# Patient Record
Sex: FEMALE | Race: WHITE | Employment: FULL TIME | URBAN - METROPOLITAN AREA
[De-identification: names, ages, dates, MRNs, and addresses within clinical notes are randomized per-mention and may not be internally consistent; named-entity substitution may affect disease eponyms.]

---

## 2017-04-24 ENCOUNTER — HOSPITAL ENCOUNTER (OUTPATIENT)
Dept: MAMMOGRAPHY | Age: 52
Discharge: HOME | End: 2017-04-24
Attending: PLASTIC SURGERY
Payer: COMMERCIAL

## 2017-04-24 DIAGNOSIS — Z12.31 ENCOUNTER FOR SCREENING MAMMOGRAM FOR MALIGNANT NEOPLASM OF BREAST: ICD-10-CM

## 2017-04-24 PROCEDURE — 77067 SCR MAMMO BI INCL CAD: CPT

## 2017-06-09 ENCOUNTER — HOSPITAL ENCOUNTER (OUTPATIENT)
Dept: SURGERY | Age: 52
Discharge: HOME | End: 2017-06-09
Attending: PLASTIC SURGERY
Payer: COMMERCIAL

## 2017-06-09 VITALS
OXYGEN SATURATION: 95 % | TEMPERATURE: 97.1 F | DIASTOLIC BLOOD PRESSURE: 82 MMHG | HEIGHT: 64 IN | SYSTOLIC BLOOD PRESSURE: 127 MMHG | HEART RATE: 58 BPM | WEIGHT: 205 LBS | BODY MASS INDEX: 35 KG/M2

## 2017-06-09 LAB — HGB BLD-MCNC: 14.3 G/DL (ref 11.7–15.4)

## 2017-06-09 PROCEDURE — 85018 HEMOGLOBIN: CPT | Performed by: ANESTHESIOLOGY

## 2017-06-09 RX ORDER — BISMUTH SUBSALICYLATE 262 MG
1 TABLET,CHEWABLE ORAL DAILY
COMMUNITY

## 2017-06-09 NOTE — PERIOP NOTES
Patient verified name, , and surgery as listed in [facility]. Type 2 surgery, walk in assessment complete. Labs per surgeon: none needed. Labs per anesthesia protocol: HGB; results pending. EKG: not needed per [facility] protocols. Hibiclens and instructions given per hospital policy. Patient provided with handouts including Guide to Surgery, Pain Management, Hand Hygiene, Blood Transfusion Education, and [employer] Anesthesia Brochure. Patient answered medical/surgical history questions at their best of ability. All prior to admission medications documented in [facility]. Original medication prescription bottles not visualized during patient appointment. Patient instructed to hold all vitamins 7 days prior to surgery and NSAIDS 5 days prior to surgery, patient verbalized understanding. Medications to be held: vitamins. Patient instructed to continue previous medications as prescribed prior to surgery and to take the following medications the day of surgery according to anesthesia guidelines with a small sip of water: none. Patient taught back and verbalized understanding.

## 2017-06-18 ENCOUNTER — ANESTHESIA EVENT (OUTPATIENT)
Dept: SURGERY | Age: 52
End: 2017-06-18
Payer: COMMERCIAL

## 2017-06-19 ENCOUNTER — HOSPITAL ENCOUNTER (OUTPATIENT)
Age: 52
Setting detail: OUTPATIENT SURGERY
Discharge: HOME | End: 2017-06-19
Attending: PLASTIC SURGERY | Admitting: PLASTIC SURGERY
Payer: COMMERCIAL

## 2017-06-19 ENCOUNTER — ANESTHESIA (OUTPATIENT)
Dept: SURGERY | Age: 52
End: 2017-06-19
Payer: COMMERCIAL

## 2017-06-19 VITALS
WEIGHT: 205.56 LBS | HEIGHT: 64 IN | RESPIRATION RATE: 16 BRPM | BODY MASS INDEX: 35.09 KG/M2 | TEMPERATURE: 98 F | HEART RATE: 48 BPM | DIASTOLIC BLOOD PRESSURE: 54 MMHG | OXYGEN SATURATION: 93 % | SYSTOLIC BLOOD PRESSURE: 97 MMHG

## 2017-06-19 LAB — HCG UR QL: NEGATIVE

## 2017-06-19 PROCEDURE — 77030011825 HC SUPP SURG PSTOP S2SG -B: Performed by: PLASTIC SURGERY

## 2017-06-19 PROCEDURE — 77030005325: Performed by: PLASTIC SURGERY

## 2017-06-19 PROCEDURE — 74011250637 HC RX REV CODE- 250/637: Performed by: ANESTHESIOLOGY

## 2017-06-19 PROCEDURE — 77030008703 HC TU ET UNCUF COVD -A: Performed by: NURSE ANESTHETIST, CERTIFIED REGISTERED

## 2017-06-19 PROCEDURE — 81025 URINE PREGNANCY TEST: CPT

## 2017-06-19 PROCEDURE — 74011250636 HC RX REV CODE- 250/636

## 2017-06-19 PROCEDURE — 74011250636 HC RX REV CODE- 250/636: Performed by: ANESTHESIOLOGY

## 2017-06-19 PROCEDURE — 77030020782 HC GWN BAIR PAWS FLX 3M -B: Performed by: NURSE ANESTHETIST, CERTIFIED REGISTERED

## 2017-06-19 PROCEDURE — 74011000250 HC RX REV CODE- 250

## 2017-06-19 PROCEDURE — 77030002916 HC SUT ETHLN J&J -A: Performed by: PLASTIC SURGERY

## 2017-06-19 PROCEDURE — 77030031139 HC SUT VCRL2 J&J -A: Performed by: PLASTIC SURGERY

## 2017-06-19 PROCEDURE — 76210000021 HC REC RM PH II 0.5 TO 1 HR: Performed by: PLASTIC SURGERY

## 2017-06-19 PROCEDURE — 77030008467 HC STPLR SKN COVD -B: Performed by: PLASTIC SURGERY

## 2017-06-19 PROCEDURE — 76010000171 HC OR TIME 2 TO 2.5 HR INTENSV-TIER 1: Performed by: PLASTIC SURGERY

## 2017-06-19 PROCEDURE — 77030032490 HC SLV COMPR SCD KNE COVD -B: Performed by: PLASTIC SURGERY

## 2017-06-19 PROCEDURE — 77030011640 HC PAD GRND REM COVD -A: Performed by: PLASTIC SURGERY

## 2017-06-19 PROCEDURE — 74011000250 HC RX REV CODE- 250: Performed by: ANESTHESIOLOGY

## 2017-06-19 PROCEDURE — 74011000250 HC RX REV CODE- 250: Performed by: PLASTIC SURGERY

## 2017-06-19 PROCEDURE — 88307 TISSUE EXAM BY PATHOLOGIST: CPT | Performed by: PLASTIC SURGERY

## 2017-06-19 PROCEDURE — 74011250636 HC RX REV CODE- 250/636: Performed by: PLASTIC SURGERY

## 2017-06-19 PROCEDURE — 76210000016 HC OR PH I REC 1 TO 1.5 HR: Performed by: PLASTIC SURGERY

## 2017-06-19 PROCEDURE — 77030019940 HC BLNKT HYPOTHRM STRY -B: Performed by: NURSE ANESTHETIST, CERTIFIED REGISTERED

## 2017-06-19 PROCEDURE — 76060000035 HC ANESTHESIA 2 TO 2.5 HR: Performed by: PLASTIC SURGERY

## 2017-06-19 PROCEDURE — 77030002933 HC SUT MCRYL J&J -A: Performed by: PLASTIC SURGERY

## 2017-06-19 PROCEDURE — 77030018836 HC SOL IRR NACL ICUM -A: Performed by: PLASTIC SURGERY

## 2017-06-19 PROCEDURE — 77030008477 HC STYL SATN SLP COVD -A: Performed by: NURSE ANESTHETIST, CERTIFIED REGISTERED

## 2017-06-19 RX ORDER — OXYCODONE HYDROCHLORIDE 5 MG/1
10 TABLET ORAL
Status: DISCONTINUED | OUTPATIENT
Start: 2017-06-19 | End: 2017-06-19 | Stop reason: HOSPADM

## 2017-06-19 RX ORDER — SODIUM CHLORIDE, SODIUM LACTATE, POTASSIUM CHLORIDE, CALCIUM CHLORIDE 600; 310; 30; 20 MG/100ML; MG/100ML; MG/100ML; MG/100ML
75 INJECTION, SOLUTION INTRAVENOUS CONTINUOUS
Status: DISCONTINUED | OUTPATIENT
Start: 2017-06-19 | End: 2017-06-19 | Stop reason: HOSPADM

## 2017-06-19 RX ORDER — OXYCODONE HYDROCHLORIDE 5 MG/1
5 TABLET ORAL
Status: DISCONTINUED | OUTPATIENT
Start: 2017-06-19 | End: 2017-06-19 | Stop reason: HOSPADM

## 2017-06-19 RX ORDER — GLYCOPYRROLATE 0.2 MG/ML
INJECTION INTRAMUSCULAR; INTRAVENOUS AS NEEDED
Status: DISCONTINUED | OUTPATIENT
Start: 2017-06-19 | End: 2017-06-19 | Stop reason: HOSPADM

## 2017-06-19 RX ORDER — BUPIVACAINE HYDROCHLORIDE 5 MG/ML
INJECTION, SOLUTION EPIDURAL; INTRACAUDAL AS NEEDED
Status: DISCONTINUED | OUTPATIENT
Start: 2017-06-19 | End: 2017-06-19 | Stop reason: HOSPADM

## 2017-06-19 RX ORDER — BUPIVACAINE HYDROCHLORIDE 2.5 MG/ML
INJECTION, SOLUTION EPIDURAL; INFILTRATION; INTRACAUDAL AS NEEDED
Status: DISCONTINUED | OUTPATIENT
Start: 2017-06-19 | End: 2017-06-19 | Stop reason: HOSPADM

## 2017-06-19 RX ORDER — MIDAZOLAM HYDROCHLORIDE 1 MG/ML
2 INJECTION, SOLUTION INTRAMUSCULAR; INTRAVENOUS ONCE
Status: DISCONTINUED | OUTPATIENT
Start: 2017-06-19 | End: 2017-06-19 | Stop reason: HOSPADM

## 2017-06-19 RX ORDER — FENTANYL CITRATE 50 UG/ML
100 INJECTION, SOLUTION INTRAMUSCULAR; INTRAVENOUS ONCE
Status: DISCONTINUED | OUTPATIENT
Start: 2017-06-19 | End: 2017-06-19 | Stop reason: HOSPADM

## 2017-06-19 RX ORDER — ONDANSETRON 2 MG/ML
INJECTION INTRAMUSCULAR; INTRAVENOUS AS NEEDED
Status: DISCONTINUED | OUTPATIENT
Start: 2017-06-19 | End: 2017-06-19 | Stop reason: HOSPADM

## 2017-06-19 RX ORDER — FAMOTIDINE 20 MG/1
20 TABLET, FILM COATED ORAL ONCE
Status: COMPLETED | OUTPATIENT
Start: 2017-06-19 | End: 2017-06-19

## 2017-06-19 RX ORDER — BACITRACIN ZINC 500 UNIT/G
OINTMENT (GRAM) TOPICAL AS NEEDED
Status: DISCONTINUED | OUTPATIENT
Start: 2017-06-19 | End: 2017-06-19 | Stop reason: HOSPADM

## 2017-06-19 RX ORDER — LIDOCAINE HYDROCHLORIDE AND EPINEPHRINE 10; 10 MG/ML; UG/ML
INJECTION, SOLUTION INFILTRATION; PERINEURAL AS NEEDED
Status: DISCONTINUED | OUTPATIENT
Start: 2017-06-19 | End: 2017-06-19 | Stop reason: HOSPADM

## 2017-06-19 RX ORDER — KETOROLAC TROMETHAMINE 30 MG/ML
INJECTION, SOLUTION INTRAMUSCULAR; INTRAVENOUS AS NEEDED
Status: DISCONTINUED | OUTPATIENT
Start: 2017-06-19 | End: 2017-06-19 | Stop reason: HOSPADM

## 2017-06-19 RX ORDER — PROPOFOL 10 MG/ML
INJECTION, EMULSION INTRAVENOUS AS NEEDED
Status: DISCONTINUED | OUTPATIENT
Start: 2017-06-19 | End: 2017-06-19 | Stop reason: HOSPADM

## 2017-06-19 RX ORDER — LIDOCAINE HYDROCHLORIDE 10 MG/ML
0.1 INJECTION INFILTRATION; PERINEURAL AS NEEDED
Status: DISCONTINUED | OUTPATIENT
Start: 2017-06-19 | End: 2017-06-19 | Stop reason: HOSPADM

## 2017-06-19 RX ORDER — ACETAMINOPHEN 10 MG/ML
INJECTION, SOLUTION INTRAVENOUS AS NEEDED
Status: DISCONTINUED | OUTPATIENT
Start: 2017-06-19 | End: 2017-06-19 | Stop reason: HOSPADM

## 2017-06-19 RX ORDER — CEFAZOLIN SODIUM IN 0.9 % NACL 2 G/50 ML
2 INTRAVENOUS SOLUTION, PIGGYBACK (ML) INTRAVENOUS ONCE
Status: COMPLETED | OUTPATIENT
Start: 2017-06-19 | End: 2017-06-19

## 2017-06-19 RX ORDER — NEOSTIGMINE METHYLSULFATE 1 MG/ML
INJECTION INTRAVENOUS AS NEEDED
Status: DISCONTINUED | OUTPATIENT
Start: 2017-06-19 | End: 2017-06-19 | Stop reason: HOSPADM

## 2017-06-19 RX ORDER — FENTANYL CITRATE 50 UG/ML
INJECTION, SOLUTION INTRAMUSCULAR; INTRAVENOUS AS NEEDED
Status: DISCONTINUED | OUTPATIENT
Start: 2017-06-19 | End: 2017-06-19 | Stop reason: HOSPADM

## 2017-06-19 RX ORDER — DEXAMETHASONE SODIUM PHOSPHATE 4 MG/ML
INJECTION, SOLUTION INTRA-ARTICULAR; INTRALESIONAL; INTRAMUSCULAR; INTRAVENOUS; SOFT TISSUE AS NEEDED
Status: DISCONTINUED | OUTPATIENT
Start: 2017-06-19 | End: 2017-06-19 | Stop reason: HOSPADM

## 2017-06-19 RX ORDER — LIDOCAINE HYDROCHLORIDE 20 MG/ML
INJECTION, SOLUTION EPIDURAL; INFILTRATION; INTRACAUDAL; PERINEURAL AS NEEDED
Status: DISCONTINUED | OUTPATIENT
Start: 2017-06-19 | End: 2017-06-19 | Stop reason: HOSPADM

## 2017-06-19 RX ORDER — ROCURONIUM BROMIDE 10 MG/ML
INJECTION, SOLUTION INTRAVENOUS AS NEEDED
Status: DISCONTINUED | OUTPATIENT
Start: 2017-06-19 | End: 2017-06-19 | Stop reason: HOSPADM

## 2017-06-19 RX ORDER — HYDROMORPHONE HYDROCHLORIDE 2 MG/ML
0.5 INJECTION, SOLUTION INTRAMUSCULAR; INTRAVENOUS; SUBCUTANEOUS
Status: DISCONTINUED | OUTPATIENT
Start: 2017-06-19 | End: 2017-06-19 | Stop reason: HOSPADM

## 2017-06-19 RX ORDER — SODIUM CHLORIDE 9 MG/ML
INJECTION INTRAMUSCULAR; INTRAVENOUS; SUBCUTANEOUS AS NEEDED
Status: DISCONTINUED | OUTPATIENT
Start: 2017-06-19 | End: 2017-06-19 | Stop reason: HOSPADM

## 2017-06-19 RX ADMIN — ROCURONIUM BROMIDE 10 MG: 10 INJECTION, SOLUTION INTRAVENOUS at 08:27

## 2017-06-19 RX ADMIN — FENTANYL CITRATE 100 MCG: 50 INJECTION, SOLUTION INTRAMUSCULAR; INTRAVENOUS at 07:29

## 2017-06-19 RX ADMIN — DEXAMETHASONE SODIUM PHOSPHATE 10 MG: 4 INJECTION, SOLUTION INTRA-ARTICULAR; INTRALESIONAL; INTRAMUSCULAR; INTRAVENOUS; SOFT TISSUE at 08:51

## 2017-06-19 RX ADMIN — SODIUM CHLORIDE, SODIUM LACTATE, POTASSIUM CHLORIDE, AND CALCIUM CHLORIDE 75 ML/HR: 600; 310; 30; 20 INJECTION, SOLUTION INTRAVENOUS at 06:10

## 2017-06-19 RX ADMIN — HYDROMORPHONE HYDROCHLORIDE 0.5 MG: 2 INJECTION, SOLUTION INTRAMUSCULAR; INTRAVENOUS; SUBCUTANEOUS at 09:56

## 2017-06-19 RX ADMIN — ACETAMINOPHEN 1000 MG: 10 INJECTION, SOLUTION INTRAVENOUS at 09:30

## 2017-06-19 RX ADMIN — LIDOCAINE HYDROCHLORIDE 100 MG: 20 INJECTION, SOLUTION EPIDURAL; INFILTRATION; INTRACAUDAL; PERINEURAL at 07:29

## 2017-06-19 RX ADMIN — FENTANYL CITRATE 50 MCG: 50 INJECTION, SOLUTION INTRAMUSCULAR; INTRAVENOUS at 09:15

## 2017-06-19 RX ADMIN — SODIUM CHLORIDE, SODIUM LACTATE, POTASSIUM CHLORIDE, AND CALCIUM CHLORIDE: 600; 310; 30; 20 INJECTION, SOLUTION INTRAVENOUS at 07:24

## 2017-06-19 RX ADMIN — FENTANYL CITRATE 50 MCG: 50 INJECTION, SOLUTION INTRAMUSCULAR; INTRAVENOUS at 08:27

## 2017-06-19 RX ADMIN — ROCURONIUM BROMIDE 40 MG: 10 INJECTION, SOLUTION INTRAVENOUS at 07:29

## 2017-06-19 RX ADMIN — GLYCOPYRROLATE 0.4 MG: 0.2 INJECTION INTRAMUSCULAR; INTRAVENOUS at 09:30

## 2017-06-19 RX ADMIN — NEOSTIGMINE METHYLSULFATE 3 MG: 1 INJECTION INTRAVENOUS at 09:30

## 2017-06-19 RX ADMIN — GLYCOPYRROLATE 0.2 MG: 0.2 INJECTION INTRAMUSCULAR; INTRAVENOUS at 08:51

## 2017-06-19 RX ADMIN — HYDROMORPHONE HYDROCHLORIDE 0.5 MG: 2 INJECTION, SOLUTION INTRAMUSCULAR; INTRAVENOUS; SUBCUTANEOUS at 10:07

## 2017-06-19 RX ADMIN — SODIUM CHLORIDE, SODIUM LACTATE, POTASSIUM CHLORIDE, AND CALCIUM CHLORIDE: 600; 310; 30; 20 INJECTION, SOLUTION INTRAVENOUS at 09:30

## 2017-06-19 RX ADMIN — PROPOFOL 180 MG: 10 INJECTION, EMULSION INTRAVENOUS at 07:29

## 2017-06-19 RX ADMIN — HYDROMORPHONE HYDROCHLORIDE 0.5 MG: 2 INJECTION, SOLUTION INTRAMUSCULAR; INTRAVENOUS; SUBCUTANEOUS at 10:02

## 2017-06-19 RX ADMIN — ONDANSETRON 4 MG: 2 INJECTION INTRAMUSCULAR; INTRAVENOUS at 08:51

## 2017-06-19 RX ADMIN — FENTANYL CITRATE 50 MCG: 50 INJECTION, SOLUTION INTRAMUSCULAR; INTRAVENOUS at 07:45

## 2017-06-19 RX ADMIN — FAMOTIDINE 20 MG: 20 TABLET, FILM COATED ORAL at 06:00

## 2017-06-19 RX ADMIN — CEFAZOLIN 2 G: 1 INJECTION, POWDER, FOR SOLUTION INTRAMUSCULAR; INTRAVENOUS; PARENTERAL at 07:26

## 2017-06-19 RX ADMIN — FENTANYL CITRATE 50 MCG: 50 INJECTION, SOLUTION INTRAMUSCULAR; INTRAVENOUS at 07:40

## 2017-06-19 RX ADMIN — LIDOCAINE HYDROCHLORIDE 0.1 ML: 10 INJECTION, SOLUTION INFILTRATION; PERINEURAL at 06:10

## 2017-06-19 RX ADMIN — KETOROLAC TROMETHAMINE 30 MG: 30 INJECTION, SOLUTION INTRAMUSCULAR; INTRAVENOUS at 09:30

## 2017-06-19 NOTE — ANESTHESIA PREPROCEDURE EVALUATION
Anesthetic History               Review of Systems / Medical History      Pulmonary                   Neuro/Psych              Cardiovascular                  Exercise tolerance: >4 METS     GI/Hepatic/Renal                Endo/Other        Obesity     Other Findings              Physical Exam    Airway  Mallampati: I  TM Distance: > 6 cm  Neck ROM: normal range of motion   Mouth opening: Normal     Cardiovascular  Regular rate and rhythm,  S1 and S2 normal,  no murmur, click, rub, or gallop  Rhythm: regular  Rate: normal         Dental  No notable dental hx       Pulmonary  Breath sounds clear to auscultation               Abdominal         Other Findings            Anesthetic Plan    ASA: 2  Anesthesia type: general            Anesthetic plan and risks discussed with: Patient

## 2017-06-19 NOTE — IP AVS SNAPSHOT
[address] 
[phone] Patient: Mor Ball MRN: [id number] WCZ:2[birth year]66 You are allergic to the following Allergen Reactions Influenza Virus Vaccine Trival 3950-4073 (18 Yr +) Swelling Recent Documentation Height Weight BMI OB Status Smoking Status 1.626 m 93.2 kg 35.28 kg/m2 Menopause Never Smoker Emergency Contacts Name Discharge Info Relation Home Work Mobile Kale Mai DISCHARGE CAREGIVER [3] Spouse [3] [phone] About your hospitalization You were admitted on:  June 19, 2017 You last received care in the:  [facility] PACU You were discharged on:  June 19, 2017 Unit phone number:  [phone] Why you were hospitalized Your primary diagnosis was:  Not on File Providers Seen During Your Hospitalizations Provider Role Specialty Primary office phone Ab Hayes MD Attending Provider Plastic Surgery [phone] Your Primary Care Physician (PCP) Primary Care Physician Office Phone Office Fax [address] [address] [phone] Follow-up Information Follow up With Details Comments Contact Info Mirian Vallecillo MD   [facility] [phone] Ab Hayes MD  Please call for follow-up appointment. [address] [place] [address] Surgery [facility] [id number] [phone] Current Discharge Medication List  
  
ASK your doctor about these medications Dose & Instructions Dispensing Information Comments Morning Noon Evening Bedtime  
 multivitamin tablet Commonly known as:  ONE A DAY Your last dose was: Your next dose is:    
   
   
 Dose:  1 Tab Take 1 Tab by mouth daily. Refills:  0 Discharge Instructions Breast Reduction: What to Expect at [facility] Your Recovery Breast reduction surgery removes some of the breast tissue and skin from the breasts. This reshapes and lifts the breasts and reduces their size. It can also make the dark area around the nipple smaller. After surgery, you will probably feel weak. You may feel sore for 2 to 3 weeks. You also may feel pulling or stretching in your breast area. Although you may need pain medicine for a week or two, you can expect to feel better and stronger each day. For several weeks, you may get tired easily or have less energy than usual. You also may have the feeling that fluid is moving in your breasts. This feeling is normal and will go away over time. Stitches usually are removed in 5 to 10 days. Your new breasts may feel firmer and look rounder. Breast reduction may change the normal feeling in your breast. But in time, some feeling may return. Keep in mind that it may take time to get used to your new breasts. You will have swelling at first, but the breasts will soften and develop better shape over time. This care sheet gives you a general idea about how long it will take for you to recover. But each person recovers at a different pace. Follow the steps below to get better as quickly as possible. How can you care for yourself at home? Activity · Rest when you feel tired. Getting enough sleep will help you recover. · For about 2 weeks after surgery, avoid lifting anything that would make you strain. This may include heavy grocery bags and milk containers, a heavy briefcase or backpack, cat litter or dog food bags, a vacuum , or a child. Do not lift anything over your head for 2 to 3 weeks. · Ask your doctor when you can drive again. · Ask your doctor when it is okay for you to have sex. · You can take your first shower the day after your drain or bandage is removed. This is usually within about 1 week.  Sometimes doctors say it is okay to shower the day after surgery. Do not take a bath or soak in a hot tub for about 4 weeks. · You will probably be able to go back to work or your normal routine in 2 to 3 weeks. This depends on the type of work you do and any further treatment. Diet · You can eat your normal diet. If your stomach is upset, try bland, low-fat foods like plain rice, broiled chicken, toast, and yogurt. · Drink plenty of fluids (unless your doctor tells you not to). · You may notice that your bowel movements are not regular right after your surgery. This is common. Try to avoid constipation and straining with bowel movements. Take a fiber supplement. If you have not had a bowel movement after a couple of days, take a mild laxative. Medicines · Your doctor will tell you if and when you can restart your medicines. He or she will also give you instructions about taking any new medicines. · If you take blood thinners, such as warfarin (Coumadin), clopidogrel (Plavix), or aspirin, be sure to talk to your doctor. He or she will tell you if and when to start taking those medicines again. Make sure that you understand exactly what your doctor wants you to do. · Take pain medicines exactly as directed. ¨ If the doctor gave you a prescription medicine for pain, take it as prescribed. ¨ If you are not taking a prescription pain medicine, ask your doctor if you can take an over-the-counter medicine. · If you think your pain medicine is making you sick to your stomach: 
¨ Take your medicine after meals (unless your doctor has told you not to). ¨ Ask your doctor for a different pain medicine. · If you were given medicine for nausea, take it as directed. · If your doctor prescribed antibiotics, take them as directed. Do not stop taking them just because you feel better. You need to take the full course of antibiotics. Incision care · If your doctor gave you specific instructions on how to care for your incision, follow those instructions. · You may be wearing a special bra that holds your bandages in place after the surgery. Your doctor will tell you when you can stop wearing the bra. Your doctor may want you to wear the bra at night as well as during the day for several weeks. Do not wear an underwire bra for 1 month. · If you have strips of tape on your incision, leave the tape on for a week or until it falls off. Or follow your doctor's instructions for removing the tape. · Wash the area daily with warm, soapy water, and pat it dry. Don't use hydrogen peroxide or alcohol, which can slow healing. · You may cover the area with a gauze bandage if it weeps or rubs against clothing. Change the bandage every day. Consider having someone help you with this. Exercise · Try to walk each day. Start by walking a little more than you did the day before. Bit by bit, increase the amount you walk. Walking boosts blood flow and helps prevent pneumonia and constipation. · Avoid strenuous activities, such as bicycle riding, jogging, weight lifting, or aerobic exercise, until your doctor says it is okay. · Your doctor will tell you when to begin stretching exercises and normal activities. Ice · Put ice or a cold pack over your breast for 10 to 20 minutes at a time. Try to do this every 1 to 2 hours for the next 3 days (when you are awake) or until the swelling goes down. Put a thin cloth between the ice and your skin. Other instructions · You may have one or more drains near your incisions. Your doctor will tell you how to take care of them. Drains are usually removed in the first week after surgery. Follow-up care is a key part of your treatment and safety. Be sure to make and go to all appointments, and call your doctor if you are having problems. It's also a good idea to know your test results and keep a list of the medicines you take. When should you call for help? Call 911 anytime you think you may need emergency care. For example, call if: 
· You passed out (lost consciousness). · You have sudden chest pain and shortness of breath, or you cough up blood. Call your doctor now or seek immediate medical care if: 
· You have severe pain in your breast area. · You have fluid under the skin or a lot of swelling at the surgery site. · You are sick to your stomach or cannot keep fluids down. · You have pain that does not get better after you take pain medicine. · You have signs of infection, such as: 
¨ Increased pain, swelling, warmth, or redness. ¨ Red streaks leading from the incision. ¨ Pus draining from the incision. ¨ A fever. · You have loose stitches, or your incision comes open. · You bleed through a large bandage over your incision. · You develop a cough. · You have signs of a blood clot, such as: 
¨ Pain in your calf, back of the knee, thigh, or groin. ¨ Redness and swelling in your leg or groin. · You feel anxious or depressed, or have trouble sleeping. · You are not getting better as expected. Where can you learn more? Go to http://william-cassie.info/. Enter T113 in the search box to learn more about \"Breast Reduction: What to Expect at Home. \" Current as of: October 13, 2016 Content Version: 11.2 © 1179-9350 Healthwise[employer] Incorporated. Care instructions adapted under license by [employer] (which disclaims liability or warranty for this information). If you have questions about a medical condition or this instruction, always ask your healthcare professional. [address] [address] any warranty or liability for your use of this information. Discharge Orders None Introducing [facility]! Elen Connor introduces [employer]. patient portal. Now you can access parts of your medical record, email your doctor's office, and request medication refills online.    
 
1. In your internet browser, go to [email or web address] 2. Click on the First Time User? Click Here link in the Sign In box. You will see the New Member Sign Up page. 3. Enter your [employer] Access Code exactly as it appears below. You will not need to use this code after youve completed the sign-up process. If you do not sign up before the expiration date, you must request a new code. · [employer] Access Code: [email or web address] Expires: 9/6/2017 12:47 PM 
 
4. Enter the last four digits of your Social Security Number (xxxx) and Date of Birth (mm/dd/yyyy) as indicated and click Submit. You will be taken to the next sign-up page. 5. Create a [employer] ID. This will be your [employer] login ID and cannot be changed, so think of one that is secure and easy to remember. 6. Create a [employer] password. You can change your password at any time. 7. Enter your Password Reset Question and Answer. This can be used at a later time if you forget your password. 8. Enter your e-mail address. You will receive e-mail notification when new information is available in [address] 9. Click Sign Up. You can now view and download portions of your medical record. 10. Click the Download Summary menu link to download a portable copy of your medical information. If you have questions, please visit the Frequently Asked Questions section of the [employer] website. Remember, [employer] is NOT to be used for urgent needs. For medical emergencies, dial 911. Now available from your iPhone and Android! General Information Please provide this summary of care documentation to your next provider. Patient Signature:  ____________________________________________________________ Date:  ____________________________________________________________  
  
Jorje Has Provider Signature:  ____________________________________________________________ Date:  ____________________________________________________________

## 2017-06-19 NOTE — DISCHARGE INSTRUCTIONS
Breast Reduction: What to Expect at Home  Your Recovery  Breast reduction surgery removes some of the breast tissue and skin from the breasts. This reshapes and lifts the breasts and reduces their size. It can also make the dark area around the nipple smaller. After surgery, you will probably feel weak. You may feel sore for 2 to 3 weeks. You also may feel pulling or stretching in your breast area. Although you may need pain medicine for a week or two, you can expect to feel better and stronger each day. For several weeks, you may get tired easily or have less energy than usual. You also may have the feeling that fluid is moving in your breasts. This feeling is normal and will go away over time. Stitches usually are removed in 5 to 10 days. Your new breasts may feel firmer and look rounder. Breast reduction may change the normal feeling in your breast. But in time, some feeling may return. Keep in mind that it may take time to get used to your new breasts. You will have swelling at first, but the breasts will soften and develop better shape over time. This care sheet gives you a general idea about how long it will take for you to recover. But each person recovers at a different pace. Follow the steps below to get better as quickly as possible. How can you care for yourself at home? Activity  · Rest when you feel tired. Getting enough sleep will help you recover. · For about 2 weeks after surgery, avoid lifting anything that would make you strain. This may include heavy grocery bags and milk containers, a heavy briefcase or backpack, cat litter or dog food bags, a vacuum , or a child. Do not lift anything over your head for 2 to 3 weeks. · Ask your doctor when you can drive again. · Ask your doctor when it is okay for you to have sex. · You can take your first shower the day after your drain or bandage is removed. This is usually within about 1 week.  Sometimes doctors say it is okay to shower the day after surgery. Do not take a bath or soak in a hot tub for about 4 weeks. · You will probably be able to go back to work or your normal routine in 2 to 3 weeks. This depends on the type of work you do and any further treatment. Diet  · You can eat your normal diet. If your stomach is upset, try bland, low-fat foods like plain rice, broiled chicken, toast, and yogurt. · Drink plenty of fluids (unless your doctor tells you not to). · You may notice that your bowel movements are not regular right after your surgery. This is common. Try to avoid constipation and straining with bowel movements. Take a fiber supplement. If you have not had a bowel movement after a couple of days, take a mild laxative. Medicines  · Your doctor will tell you if and when you can restart your medicines. He or she will also give you instructions about taking any new medicines. · If you take blood thinners, such as warfarin (Coumadin), clopidogrel (Plavix), or aspirin, be sure to talk to your doctor. He or she will tell you if and when to start taking those medicines again. Make sure that you understand exactly what your doctor wants you to do. · Take pain medicines exactly as directed. ¨ If the doctor gave you a prescription medicine for pain, take it as prescribed. ¨ If you are not taking a prescription pain medicine, ask your doctor if you can take an over-the-counter medicine. · If you think your pain medicine is making you sick to your stomach:  ¨ Take your medicine after meals (unless your doctor has told you not to). ¨ Ask your doctor for a different pain medicine. · If you were given medicine for nausea, take it as directed. · If your doctor prescribed antibiotics, take them as directed. Do not stop taking them just because you feel better. You need to take the full course of antibiotics.   Incision care  · If your doctor gave you specific instructions on how to care for your incision, follow those instructions. · You may be wearing a special bra that holds your bandages in place after the surgery. Your doctor will tell you when you can stop wearing the bra. Your doctor may want you to wear the bra at night as well as during the day for several weeks. Do not wear an underwire bra for 1 month. · If you have strips of tape on your incision, leave the tape on for a week or until it falls off. Or follow your doctor's instructions for removing the tape. · Wash the area daily with warm, soapy water, and pat it dry. Don't use hydrogen peroxide or alcohol, which can slow healing. · You may cover the area with a gauze bandage if it weeps or rubs against clothing. Change the bandage every day. Consider having someone help you with this. Exercise  · Try to walk each day. Start by walking a little more than you did the day before. Bit by bit, increase the amount you walk. Walking boosts blood flow and helps prevent pneumonia and constipation. · Avoid strenuous activities, such as bicycle riding, jogging, weight lifting, or aerobic exercise, until your doctor says it is okay. · Your doctor will tell you when to begin stretching exercises and normal activities. Ice  · Put ice or a cold pack over your breast for 10 to 20 minutes at a time. Try to do this every 1 to 2 hours for the next 3 days (when you are awake) or until the swelling goes down. Put a thin cloth between the ice and your skin. Other instructions  · You may have one or more drains near your incisions. Your doctor will tell you how to take care of them. Drains are usually removed in the first week after surgery. Follow-up care is a key part of your treatment and safety. Be sure to make and go to all appointments, and call your doctor if you are having problems. It's also a good idea to know your test results and keep a list of the medicines you take. When should you call for help? Call 911 anytime you think you may need emergency care.  For example, call if:  · You passed out (lost consciousness). · You have sudden chest pain and shortness of breath, or you cough up blood. Call your doctor now or seek immediate medical care if:  · You have severe pain in your breast area. · You have fluid under the skin or a lot of swelling at the surgery site. · You are sick to your stomach or cannot keep fluids down. · You have pain that does not get better after you take pain medicine. · You have signs of infection, such as:  ¨ Increased pain, swelling, warmth, or redness. ¨ Red streaks leading from the incision. ¨ Pus draining from the incision. ¨ A fever. · You have loose stitches, or your incision comes open. · You bleed through a large bandage over your incision. · You develop a cough. · You have signs of a blood clot, such as:  ¨ Pain in your calf, back of the knee, thigh, or groin. ¨ Redness and swelling in your leg or groin. · You feel anxious or depressed, or have trouble sleeping. · You are not getting better as expected. Where can you learn more? Go to http://william-cassie.info/. Enter T113 in the search box to learn more about \"Breast Reduction: What to Expect at Home. \"  Current as of: October 13, 2016  Content Version: 11.2  © 2249-6524 [employer]. Care instructions adapted under license by [employer] (which disclaims liability or warranty for this information). If you have questions about a medical condition or this instruction, always ask your healthcare professional. [address] [address] any warranty or liability for your use of this information.

## 2017-06-19 NOTE — BRIEF OP NOTE
BRIEF OPERATIVE NOTE    Date of Procedure: 6/19/2017   Preoperative Diagnosis: Breast hypertrophy [N62]  Neck muscle weakness [M53.82]  Low back pain with sciatica, sciatica laterality unspecified, unspecified back pain laterality, unspecified chronicity [M54.40]  Intertrigo [L30.4]  Mastodynia [N64.4]  Postoperative Diagnosis: Breast hypertrophy [N62]  Neck muscle weakness [M53.82]    Procedure(s):  BILATERAL BREAST REDUCTION  Surgeon(s) and Role:     * Sury Stock MD - Primary         Assistant Staff:       Surgical Staff:  Circ-1: Modesto Porras RN  Circ-Relief: Reva Sutton RN  Scrub Tech-1: Danielle Carson  Event Time In   Incision Start 9161   Incision Close      Anesthesia: General   Estimated Blood Loss: 25cc  Specimens:   ID Type Source Tests Collected by Time Destination   1 : LEFT BREAST TISSUE Fresh Breast  Sury Stock MD 6/19/2017 4617 Pathology   2 : RIGHT BREAST TISSUE Fresh Breast  Sury Stock MD 6/19/2017 0825 Pathology      Findings: none   Complications: none  Implants: * No implants in log *

## 2017-06-19 NOTE — ANESTHESIA POSTPROCEDURE EVALUATION
Post-Anesthesia Evaluation and Assessment    Patient: Stef Hendrickson MRN: [id number]  SSN: [id number]    YOB: 1965  Age: 45 y.o. Sex: female       Cardiovascular Function/Vital Signs  Visit Vitals    BP 97/54 (BP 1 Location: Left arm, BP Patient Position: At rest)    Pulse (!) 48    Temp 36.7 °C (98 °F)    Resp 16    Ht 5' 4\" (1.626 m)    Wt 93.2 kg (205 lb 9 oz)    SpO2 93%    BMI 35.28 kg/m2       Patient is status post general anesthesia for Procedure(s):  BILATERAL BREAST REDUCTION. Nausea/Vomiting: None    Postoperative hydration reviewed and adequate. Pain:  Pain Scale 1: Visual (06/19/17 1034)  Pain Intensity 1: 0 (06/19/17 1034)   Managed    Neurological Status:   Neuro (WDL): Within Defined Limits (06/19/17 1034)  Neuro  Neurologic State: Alert;Eyes open spontaneously (06/19/17 1034)  Cognition: Follows commands (06/19/17 1034)  LUE Motor Response: Purposeful (06/19/17 1034)  LLE Motor Response: Purposeful (06/19/17 1034)  RUE Motor Response: Purposeful (06/19/17 1034)  RLE Motor Response: Purposeful (06/19/17 1034)   At baseline    Mental Status and Level of Consciousness: Arousable    Pulmonary Status:   O2 Device: Room air (06/19/17 1048)   Adequate oxygenation and airway patent    Complications related to anesthesia: None    Post-anesthesia assessment completed.  No concerns    Signed By: Meron Giles MD     June 19, 2017

## 2017-06-20 NOTE — OP NOTES
[facility] 63   OPERATIVE REPORT       Name:  Bam Michel   MR#:  [id number]   :  1965   Account #:  [de-identified]   Date of Adm:  2017       DATE OF PROCEDURE: 2017    PREOPERATIVE DIAGNOSIS: Symptomatic macromastia. POSTOPERATIVE DIAGNOSIS: Symptomatic macromastia. PROCEDURE: Bilateral breast reduction. SURGEON: Layla Fowler MD    ANESTHESIA: General.    SPECIMENS: Right breast tissue 558 grams, and left breast tissue   702 grams. ESTIMATED BLOOD LOSS: 25 mL. COMPLICATIONS: None. INDICATIONS: Ms. Neli Candelario presents with symptomatic macromastia,   desiring a breast reduction. Please see preop and consultation   notes for details of our discussions. DESCRIPTION OF PROCEDURE: After informed consent was obtained   from the patient, she was marked in the holding area in the   upright position. She was then taken to the operating room,   placed in a supine position, prepped and draped in the usual   fashion. The left breast was addressed first. This was her   larger breast. An 8 cm pedicle was centered on the breast mound   and deepithelialized with a 10 blade. A 42 mm cookie cutter was   used to score the nipple-areola complex. Next, Bovie cautery was   used to dissect through the breast tissue down to the level of   the chest wall. A 10 blade was used to further incise the skin   and Bovie cautery was used to complete that medial wedge   resection. The same technique was used for the lateral wedge and   then superiorly, skin flaps were retracted towards the ceiling,   and 2-3 cm thick skin flaps were dissected at the level of   pectoralis fascia. Next, then the pedicle was retracted towards the ceiling and the   superior portion was transected. Additional resection and   shaping was done as needed for desired shape and size. Then the   pocket was irrigated and inspected for hemostasis.  A pain pump   catheter was placed and secured with a 4-0 nylon stitch and then   the skin flaps were stapled into position in an inverted T   fashion. The same technique was used on the opposite breast. the   patient was then brought to an upright position and examined for   symmetry. She was found to have a very symmetric result. Next,   then the nipple-areola complexes were marked out 5.5 cm above   the inframammary fold with a 38 mm cookie cutter. That skin was   removed with a 15 blade and Bovie cautery, and the areola was   delivered through the circular incision. We then closed in 2   layers with 4-0 Vicryl and 5-0 Monocryl around the areola in a   vertical incision. The transverse incision was closed with 3-0   Vicryl and 4-0 Monocryl subcuticular stitches. She was then   dressed with Xeroform, bacitracin, gauze, ABD pads, and a   surgical bra. She tolerated the procedure very well, and was   escorted to Recovery in stable condition.         MD Capo Hurtado   D:  06/20/2017   07:19   T:  06/20/2017   08:59   Job #:  [id number]

## 2024-01-09 ENCOUNTER — OFFICE VISIT (OUTPATIENT)
Dept: ORTHOPEDIC SURGERY | Age: 59
End: 2024-01-09
Payer: MEDICARE

## 2024-01-09 DIAGNOSIS — M54.50 LOW BACK PAIN, UNSPECIFIED BACK PAIN LATERALITY, UNSPECIFIED CHRONICITY, UNSPECIFIED WHETHER SCIATICA PRESENT: Primary | ICD-10-CM

## 2024-01-09 DIAGNOSIS — M51.36 DDD (DEGENERATIVE DISC DISEASE), LUMBAR: ICD-10-CM

## 2024-01-09 DIAGNOSIS — M54.16 LUMBAR RADICULOPATHY: ICD-10-CM

## 2024-01-09 DIAGNOSIS — M43.16 SPONDYLOLISTHESIS OF LUMBAR REGION: ICD-10-CM

## 2024-01-09 DIAGNOSIS — M47.816 FACET ARTHROPATHY, LUMBAR: ICD-10-CM

## 2024-01-09 PROBLEM — R73.03 PREDIABETES: Status: ACTIVE | Noted: 2022-10-26

## 2024-01-09 PROCEDURE — 99204 OFFICE O/P NEW MOD 45 MIN: CPT | Performed by: PHYSICIAN ASSISTANT

## 2024-01-09 PROCEDURE — G8484 FLU IMMUNIZE NO ADMIN: HCPCS | Performed by: PHYSICIAN ASSISTANT

## 2024-01-09 PROCEDURE — 3017F COLORECTAL CA SCREEN DOC REV: CPT | Performed by: PHYSICIAN ASSISTANT

## 2024-01-09 PROCEDURE — G8421 BMI NOT CALCULATED: HCPCS | Performed by: PHYSICIAN ASSISTANT

## 2024-01-09 PROCEDURE — G8427 DOCREV CUR MEDS BY ELIG CLIN: HCPCS | Performed by: PHYSICIAN ASSISTANT

## 2024-01-09 PROCEDURE — 4004F PT TOBACCO SCREEN RCVD TLK: CPT | Performed by: PHYSICIAN ASSISTANT

## 2024-01-09 RX ORDER — MELOXICAM 7.5 MG/1
7.5 TABLET ORAL DAILY
Qty: 30 TABLET | Refills: 0 | Status: SHIPPED | OUTPATIENT
Start: 2024-01-09

## 2024-01-09 RX ORDER — PREDNISONE 20 MG/1
40 TABLET ORAL DAILY
COMMUNITY
Start: 2023-03-08 | End: 2024-01-09 | Stop reason: ALTCHOICE

## 2024-01-09 RX ORDER — MELOXICAM 7.5 MG/1
7.5 TABLET ORAL DAILY
Qty: 30 TABLET | Refills: 1 | Status: SHIPPED | OUTPATIENT
Start: 2024-01-09 | End: 2024-01-09 | Stop reason: CLARIF

## 2024-01-09 NOTE — PROGRESS NOTES
Name: Beth Mccallum  YOB: 1965  Gender: female  MRN: [id number]    CC: Back Pain (Left side low back pain into left hip with swelling)       HPI: This is a 58 y.o. year old female who reports over 4-year history of back pain mostly in the left side of the lower back.  She felt initial pain when she moved a dresser and she pushed it with her left hip and she felt a sharp pain or tear in her back and had continued pain since that time.  She notes swelling in her left buttock and left leg.  Pain can get up to 9 out of 10.  It can radiate around to the left lateral leg usually not below the knee.  It has been worse over the past 6 months and she has seen a chiropractor every 2 weeks over the past 6 months.  Symptoms worse with standing.      This patient  has not had lumbar surgery in the past.     Prior treatment: Chiropractic treatment             1/9/2024    11:57 AM   AMB PAIN ASSESSMENT   Location of Pain Back   Location Modifiers Left   Severity of Pain 5   Quality of Pain Aching   Duration of Pain Persistent   Frequency of Pain Constant   Date Pain First Started 2/6/2020   Limiting Behavior Some   Result of Injury No   Work-Related Injury No   Are there other pain locations you wish to document? No              ROS/Meds/PSH/PMH/FH/SH: I personally reviewed the patient's collected intake data.  Below are the pertinents:    Allergies   Allergen Reactions    Influenza Vaccines      Other Reaction(s): Other- (not listed) - Allergy    Neck swelling         Current Outpatient Medications:     meloxicam (MOBIC) 7.5 MG tablet, Take 1 tablet by mouth daily, Disp: 30 tablet, Rfl: 0    No past surgical history on file.    Patient Active Problem List   Diagnosis    Prediabetes         Tobacco:  has no history on file for tobacco use.  Alcohol:   Social History     Substance and Sexual Activity   Alcohol Use Not on file        Physical Exam:   BMI: There is no height or weight on file to calculate BMI.    GENERAL:

## 2024-01-09 NOTE — PROGRESS NOTES
An order for MRI of the Lumbar spine has been ordered. An order for PT on the Lumbar Spine has been entered.

## 2024-01-18 ENCOUNTER — OFFICE VISIT (OUTPATIENT)
Dept: ORTHOPEDIC SURGERY | Age: 59
End: 2024-01-18
Payer: COMMERCIAL

## 2024-01-18 DIAGNOSIS — M47.816 FACET ARTHROPATHY, LUMBAR: ICD-10-CM

## 2024-01-18 DIAGNOSIS — M54.16 LUMBAR RADICULOPATHY: ICD-10-CM

## 2024-01-18 DIAGNOSIS — M43.16 SPONDYLOLISTHESIS OF LUMBAR REGION: Primary | ICD-10-CM

## 2024-01-18 PROCEDURE — 99214 OFFICE O/P EST MOD 30 MIN: CPT | Performed by: PHYSICIAN ASSISTANT

## 2024-01-18 PROCEDURE — G8421 BMI NOT CALCULATED: HCPCS | Performed by: PHYSICIAN ASSISTANT

## 2024-01-18 PROCEDURE — 3017F COLORECTAL CA SCREEN DOC REV: CPT | Performed by: PHYSICIAN ASSISTANT

## 2024-01-18 PROCEDURE — G8428 CUR MEDS NOT DOCUMENT: HCPCS | Performed by: PHYSICIAN ASSISTANT

## 2024-01-18 PROCEDURE — G8484 FLU IMMUNIZE NO ADMIN: HCPCS | Performed by: PHYSICIAN ASSISTANT

## 2024-01-18 PROCEDURE — 4004F PT TOBACCO SCREEN RCVD TLK: CPT | Performed by: PHYSICIAN ASSISTANT

## 2024-01-18 NOTE — PROGRESS NOTES
Name: Beth Mccallum  YOB: 1965  Gender: female  MRN: [id number]    CC: Back Pain (MRI results)       HPI: This is a 58 y.o. year old female who reports over 4-year history of back pain mostly in the left side of the lower back.  She felt initial pain when she moved a dresser and she pushed it with her left hip and she felt a sharp pain or tear in her back and had continued pain since that time.  She notes swelling in her left buttock and left leg.  Pain can get up to 9 out of 10.  It can radiate around to the left lateral leg usually not below the knee.  It has been worse over the past 6 months and she has seen a chiropractor every 2 weeks over the past 6 months.  Symptoms worse with standing.      This patient  has not had lumbar surgery in the past.     Prior treatment: Chiropractic treatment    We noted she had spondylolisthesis at L4-5 and felt she likely had L4 radiculopathy and spondylitic back pain.  I ordered MRI scan of the lumbar spine.    She has not yet started physical therapy or her anti-inflammatory.         1/9/2024    11:57 AM   AMB PAIN ASSESSMENT   Location of Pain Back   Location Modifiers Left   Severity of Pain 5   Quality of Pain Aching   Duration of Pain Persistent   Frequency of Pain Constant   Date Pain First Started 2/6/2020   Limiting Behavior Some   Result of Injury No   Work-Related Injury No   Are there other pain locations you wish to document? No              ROS/Meds/PSH/PMH/FH/SH: I personally reviewed the patient's collected intake data.  Below are the pertinents:    Allergies   Allergen Reactions    Influenza Vaccines      Other Reaction(s): Other- (not listed) - Allergy    Neck swelling         Current Outpatient Medications:     meloxicam (MOBIC) 7.5 MG tablet, Take 1 tablet by mouth daily, Disp: 30 tablet, Rfl: 0    No past surgical history on file.    Patient Active Problem List   Diagnosis    Prediabetes         Tobacco:  has no history on file for tobacco

## 2024-01-26 ENCOUNTER — APPOINTMENT (OUTPATIENT)
Dept: PHYSICAL THERAPY | Age: 59
End: 2024-01-26
Payer: COMMERCIAL

## 2024-01-31 ENCOUNTER — HOSPITAL ENCOUNTER (OUTPATIENT)
Dept: PHYSICAL THERAPY | Age: 59
Setting detail: RECURRING SERIES
Discharge: HOME | End: 2024-02-03
Payer: COMMERCIAL

## 2024-01-31 DIAGNOSIS — M25.552 PAIN IN LEFT HIP: ICD-10-CM

## 2024-01-31 DIAGNOSIS — M54.59 OTHER LOW BACK PAIN: Primary | ICD-10-CM

## 2024-01-31 PROCEDURE — 97110 THERAPEUTIC EXERCISES: CPT

## 2024-01-31 PROCEDURE — 97112 NEUROMUSCULAR REEDUCATION: CPT

## 2024-01-31 PROCEDURE — 97161 PT EVAL LOW COMPLEX 20 MIN: CPT

## 2024-01-31 ASSESSMENT — PAIN SCALES - GENERAL: PAINLEVEL_OUTOF10: 7

## 2024-01-31 NOTE — PROGRESS NOTES
Date:   Date:     Activity/Exercise Parameters Parameters Parameters   Lower trunk rotations X30      Psoas leg lifts 6 x 2 iso 4s     Sit/slouch X10 10# db     Hip flexor stretch standing 2 x 15 s B                         THERAPEUTIC ACTIVITY: (0  minutes):    Therapeutic activities per grid below to improve balance and coordination.  Required moderate visual, verbal, manual, and tactile cues to promote motor control of lower extremity(s).  NEUROMUSCULAR RE-EDUCATION: (12 minutes):    Exercise/activities per grid below to improve balance, coordination, kinesthetic sense, and posture.  Required moderate visual, verbal, manual, and tactile cues to promote static and dynamic balance in standing.   Date:  1/31/2024 Date:   Date:     Activity/Exercise Parameters Parameters Parameters   Posterior pelvic tilts  8x 2 iso 4 seconds with cueing for breathing.     Pelvic tilt into Bridge 8 x 3 iso 3 seconds     Mini crunch 8 x 2 iso 3 with cueing for breathing and lumbar stability                               MANUAL THERAPY: (0 minutes):   Joint mobilization, Soft tissue mobilization, and Manipulation was utilized and necessary because of the patient's restricted joint motion, painful spasm, loss of articular motion, and restricted motion of soft tissue.       Treatment/Session Summary:    Treatment Assessment:   Pt educated regarding pathology, pain management and HEP, with written instruction provided.  Pt tolerated interventions well with reports of mild fatigue, verbalized understanding of HEP and plan of care.   Communication/Consultation:  Therapy Evaluation sent to referring provider  Equipment provided today:  HEP  Recommendations/Intent for next treatment session: Next visit will focus on anterior core motor control, hip flexor mobility and eccentric strengthening, lumbar rom and stabilization.    >Total Treatment Billable Duration:  47 minutes, 21 low complexity eval, 14 TE, 12 NMR  Time In: 0801  Time Out:

## 2024-02-05 ENCOUNTER — HOSPITAL ENCOUNTER (OUTPATIENT)
Dept: PHYSICAL THERAPY | Age: 59
Setting detail: RECURRING SERIES
End: 2024-02-05
Payer: COMMERCIAL

## 2024-02-07 ENCOUNTER — HOSPITAL ENCOUNTER (OUTPATIENT)
Dept: PHYSICAL THERAPY | Age: 59
Setting detail: RECURRING SERIES
End: 2024-02-07
Payer: COMMERCIAL

## 2024-02-07 NOTE — PROGRESS NOTES
Beth Mccallum  : 1965  Primary: [facility]  Secondary:  [facility]  [address]  [address]  [facility] [address]  Phone: [phone]  Fax: [phone]    PT Visit Info:    Progress Note Due Date: 24  Canceled Appointment: 2        OUTPATIENT THERAPY: 2024  Episode  Appt Desk        Beth Mccallum cancelled her appointment for today due to  a family conflict .  Will plan to follow up next during next appointment.  Thank you,  Sean Snow, LUIS    Future Appointments   Date Time Provider Department Center   2024  4:00 PM Sean Snow, PT [facility]   2024  8:00 AM Sean Snow, PT [facility]   2024  4:00 PM Sean Snow, PT [facility]   2/15/2024  8:15 AM Sean Snow, PT [facility]   2024  9:45 AM Sean Snow, PT [facility]   2024  4:00 PM Sean Snow, PT [facility]   2024  3:30 PM Joaquina Cohen, [facility]

## 2024-02-07 NOTE — PROGRESS NOTES
Beth Mccallum  : 1965  Primary: [facility]  Secondary:  [facility]  [address]  [address]  [facility] [address]  Phone: [phone]  Fax: [phone]    PT Visit Info:    Progress Note Due Date: 24  Canceled Appointment: 1     OT Visit Info:  No data recorded    OUTPATIENT THERAPY: 2024  Episode  Appt Desk        Beth Mccallum cancelled her appointment for today due to conflicting appointment.  Will plan to follow up next during next appointment.  Thank you,  Sean Snow, PT    Future Appointments   Date Time Provider Department Center   2024  4:00 PM Sean Snow, PT [facility]   2024  8:00 AM Sean Snow, PT [facility]   2024  4:00 PM Sean Snow, PT [facility]   2/15/2024  8:15 AM Sean Snow, PT [facility]   2024  9:45 AM Sean Snow, PT [facility]   2024  4:00 PM Sean Snow, PT [facility]   2024  3:30 PM Joaquina Cohen PA-C [facility]

## 2024-02-09 ENCOUNTER — HOSPITAL ENCOUNTER (OUTPATIENT)
Dept: PHYSICAL THERAPY | Age: 59
Setting detail: RECURRING SERIES
Discharge: HOME | End: 2024-02-12
Payer: COMMERCIAL

## 2024-02-09 DIAGNOSIS — M25.552 PAIN IN LEFT HIP: ICD-10-CM

## 2024-02-09 DIAGNOSIS — M54.59 OTHER LOW BACK PAIN: Primary | ICD-10-CM

## 2024-02-09 PROCEDURE — 97110 THERAPEUTIC EXERCISES: CPT

## 2024-02-09 PROCEDURE — 97112 NEUROMUSCULAR REEDUCATION: CPT

## 2024-02-09 NOTE — PROGRESS NOTES
today  Equipment provided today:  None  Recommendations/Intent for next treatment session: Next visit will focus on anterior core motor control, hip flexor mobility and eccentric strengthening, lumbar rom and stabilization.    >Total Treatment Billable Duration:  46 minutes,  27 TE, 19 NMR  Time In: 0757  Time Out: 0843    Sean Snow PT         Charge Capture  [employer] Portal  Appt Desk     Future Appointments   Date Time Provider Department Center   2/13/2024  4:00 PM Sean Snow, PT [facility]   2/15/2024  8:15 AM Sean Snow, PT [facility]   2/19/2024  9:45 AM Sean Snow, PT [facility]   2/21/2024  4:00 PM Sean Snow, PT [facility]   2/29/2024  3:30 PM Joaquina Cohen, [facility]

## 2024-02-13 ENCOUNTER — HOSPITAL ENCOUNTER (OUTPATIENT)
Dept: PHYSICAL THERAPY | Age: 59
Setting detail: RECURRING SERIES
Discharge: HOME | End: 2024-02-16
Payer: COMMERCIAL

## 2024-02-13 PROCEDURE — 97112 NEUROMUSCULAR REEDUCATION: CPT

## 2024-02-13 PROCEDURE — 97110 THERAPEUTIC EXERCISES: CPT

## 2024-02-13 NOTE — PROGRESS NOTES
Beth Mccallum  : 1965  Primary: [facility]  Secondary:  [place] [address]  [address]  [facility] [address]  Phone: [phone]  Fax: [phone] Plan Frequency: 2x/week for 6 weeks  Plan of Care/Certification Expiration Date: 24        Plan of Care/Certification Expiration Date:  Plan of Care/Certification Expiration Date: 24    Frequency/Duration: Plan Frequency: 2x/week for 6 weeks      Time In/Out:   Time In: 1604  Time Out: 1648      PT Visit Info:    Plan Frequency: 2x/week for 6 weeks  Progress Note Due Date: 24  Progress Note Counter: 2  Canceled Appointment: 2      Visit Count:  3    OUTPATIENT PHYSICAL THERAPY:   Treatment Note 2024       Episode  (Lumbar Spine)               Treatment Diagnosis:    Other low back pain  Pain in left hip  Medical/Referring Diagnosis:    Spondylolisthesis of lumbar region  Facet arthropathy, lumbar  Lumbar radiculopathy    Referring Physician:  Joaquina Cohen PA-C MD Orders:  PT Eval and Treat   Return MD Appt:  2024  Date of Onset:  Onset Date: 20     Allergies:   Influenza vaccines  Restrictions/Precautions:   None      Interventions Planned (Treatment may consist of any combination of the following):     See Assessment Note    Subjective Comments:   Pt reports she had moderate soreness following last visit, but overall has felt pretty good over the last few days.   Initial Pain Level::     2/10  Post Session Pain Level:       1/10  Medications Last Reviewed:  2024  Updated Objective Findings:  None Today  Treatment   THERAPEUTIC EXERCISE: (26 minutes):    Exercises per grid below to improve mobility and strength.  Required moderate visual, verbal, manual, and tactile cues to promote proper body alignment.  Progressed resistance, range, repetitions, and complexity of movement as indicated.   Date:  2024 Date:  24 Date:  24   Activity/Exercise Parameters Parameters Parameters

## 2024-02-15 ENCOUNTER — HOSPITAL ENCOUNTER (OUTPATIENT)
Dept: PHYSICAL THERAPY | Age: 59
Setting detail: RECURRING SERIES
Discharge: HOME | End: 2024-02-18
Payer: COMMERCIAL

## 2024-02-15 PROCEDURE — 97112 NEUROMUSCULAR REEDUCATION: CPT

## 2024-02-15 PROCEDURE — 97110 THERAPEUTIC EXERCISES: CPT

## 2024-02-15 ASSESSMENT — PAIN SCALES - GENERAL: PAINLEVEL_OUTOF10: 2

## 2024-02-15 NOTE — PROGRESS NOTES
Beth Mccallum  : 1965  Primary: [facility]  Secondary:  [place] [address]  [address]  [facility] [address]  Phone: [phone]  Fax: [phone] Plan Frequency: 2x/week for 6 weeks  Plan of Care/Certification Expiration Date: 24        Plan of Care/Certification Expiration Date:  Plan of Care/Certification Expiration Date: 24    Frequency/Duration: Plan Frequency: 2x/week for 6 weeks      Time In/Out:   Time In: 815  Time Out: 08      PT Visit Info:    Plan Frequency: 2x/week for 6 weeks  Progress Note Due Date: 24  Total # of Visits to Date: 4  Progress Note Counter: 4  Canceled Appointment: 2      Visit Count:  4    OUTPATIENT PHYSICAL THERAPY:   Treatment Note 2/15/2024       Episode  (Lumbar Spine)               Treatment Diagnosis:    Other low back pain  Pain in left hip  Medical/Referring Diagnosis:    Spondylolisthesis of lumbar region  Facet arthropathy, lumbar  Lumbar radiculopathy    Referring Physician:  Joaquina Cohen PA-C MD Orders:  PT Eval and Treat   Return MD Appt:  2024  Date of Onset:  Onset Date: 20     Allergies:   Influenza vaccines  Restrictions/Precautions:   None      Interventions Planned (Treatment may consist of any combination of the following):     See Assessment Note    Subjective Comments:   Pt states she had some soreness following last visit, but soreness wore off yesterday and she's feeling pretty good today.   Initial Pain Level::     2/10  Post Session Pain Level:       2/10  Medications Last Reviewed:  2/15/2024  Updated Objective Findings:  None Today  Treatment   THERAPEUTIC EXERCISE: (26 minutes):    Exercises per grid below to improve mobility and strength.  Required moderate visual, verbal, manual, and tactile cues to promote proper body alignment.  Progressed resistance, range, repetitions, and complexity of movement as indicated.   Date:  2024 Date:  24 Date:  2/13/24 2/15/24

## 2024-02-19 ENCOUNTER — HOSPITAL ENCOUNTER (OUTPATIENT)
Dept: PHYSICAL THERAPY | Age: 59
Setting detail: RECURRING SERIES
End: 2024-02-19
Payer: COMMERCIAL

## 2024-02-19 NOTE — PROGRESS NOTES
Beth Mccallum  : 1965  Primary: [facility]  Secondary:  [facility]  [address]  [address]  [facility] [address]  Phone: [phone]  Fax: [phone]    PT Visit Info:    Total # of Visits to Date: 4  Progress Note Counter: 4  Progress Note Due Date: 24  Canceled Appointment: 3     OT Visit Info:  No data recorded    OUTPATIENT THERAPY: 2024  Episode  Appt Desk        Beth Mccallum cancelled her appointment for today due to illness.  Will plan to follow up next during next appointment.  Thank you,  Sean Snow, PT    Future Appointments   Date Time Provider Department Center   2024  1:00 PM Sean Snow, PT [facility]   2024  7:00 PM Sean Snow, PT [facility]   2024  3:15 PM Sean Snow, PT [facility]   2024  8:15 AM Sean Snow, PT [facility]   2024  3:30 PM Joaquina Cohen PA-C [facility]   3/5/2024  4:00 PM Sean Snow, PT [facility]   3/7/2024  7:30 AM Sean Snow, PT [facility]

## 2024-02-21 ENCOUNTER — APPOINTMENT (OUTPATIENT)
Dept: PHYSICAL THERAPY | Age: 59
End: 2024-02-21
Payer: COMMERCIAL

## 2024-02-22 ENCOUNTER — APPOINTMENT (OUTPATIENT)
Dept: PHYSICAL THERAPY | Age: 59
End: 2024-02-22
Payer: COMMERCIAL

## 2024-02-22 ENCOUNTER — HOSPITAL ENCOUNTER (OUTPATIENT)
Dept: PHYSICAL THERAPY | Age: 59
Setting detail: RECURRING SERIES
Discharge: HOME | End: 2024-02-25
Payer: COMMERCIAL

## 2024-02-22 PROCEDURE — 97112 NEUROMUSCULAR REEDUCATION: CPT

## 2024-02-22 PROCEDURE — 97110 THERAPEUTIC EXERCISES: CPT

## 2024-02-22 ASSESSMENT — PAIN SCALES - GENERAL: PAINLEVEL_OUTOF10: 2

## 2024-02-22 NOTE — PROGRESS NOTES
minutes):   Joint mobilization, Soft tissue mobilization, and Manipulation was utilized and necessary because of the patient's restricted joint motion, painful spasm, loss of articular motion, and restricted motion of soft tissue.       Treatment/Session Summary:    Treatment Assessment:   Pt demonstrates improved lumbar stability with functional demands, and tolerated interventions today with no increase in L hip pain.  Pt progressing gradually with anterior core motor control and neutral spine control with PT interventions.    Communication/Consultation:  None today  Equipment provided today:  None  Recommendations/Intent for next treatment session: Next visit will focus on anterior core motor control, hip flexor mobility and eccentric strengthening, lumbar rom and stabilization.    >Total Treatment Billable Duration:  47 minutes,  24 TE, 18 NMR  Time In: 1300  Time Out: 1347    Sean Snow PT         Charge Capture  [employer] [facility] Portal  Appt Desk     Future Appointments   Date Time Provider Department Center   2/27/2024  3:15 PM Sean Snow, PT [facility]   2/29/2024  7:00 PM Sean Snow, PT [facility]   3/4/2024 11:20 AM Joaquina Cohen PA-C [facility]   3/5/2024  4:00 PM Sean Snow PT [facility]   3/7/2024  7:30 AM Sean Snow PT [facility]   3/11/2024  3:15 PM Sean Snow PT [facility]   3/13/2024  3:15 PM Sean Snow, PT [facility]

## 2024-02-27 ENCOUNTER — HOSPITAL ENCOUNTER (OUTPATIENT)
Dept: PHYSICAL THERAPY | Age: 59
Setting detail: RECURRING SERIES
Discharge: HOME | End: 2024-03-01
Payer: COMMERCIAL

## 2024-02-27 PROCEDURE — 97112 NEUROMUSCULAR REEDUCATION: CPT

## 2024-02-27 PROCEDURE — 97110 THERAPEUTIC EXERCISES: CPT

## 2024-02-27 ASSESSMENT — PAIN SCALES - GENERAL: PAINLEVEL_OUTOF10: 2

## 2024-02-27 NOTE — PROGRESS NOTES
Beth Mccallum  : 1965  Primary: [facility]  Secondary:  [place] [address]  [address]  [facility] [address]  Phone: [phone]  Fax: [phone] Plan Frequency: 2x/week for 6 weeks  Plan of Care/Certification Expiration Date: 24        Plan of Care/Certification Expiration Date:  Plan of Care/Certification Expiration Date: 24    Frequency/Duration: Plan Frequency: 2x/week for 6 weeks      Time In/Out:   Time In: 1521  Time Out: 1604      PT Visit Info:    Plan Frequency: 2x/week for 6 weeks  Progress Note Due Date: 24  Total # of Visits to Date: 6  Progress Note Counter: 6  Canceled Appointment: 3      Visit Count:  6    OUTPATIENT PHYSICAL THERAPY:   Treatment Note 2024       Episode  (Lumbar Spine)               Treatment Diagnosis:    Other low back pain  Pain in left hip  Medical/Referring Diagnosis:    Spondylolisthesis of lumbar region  Facet arthropathy, lumbar  Lumbar radiculopathy    Referring Physician:  Joaquina Cohen PA-C MD Orders:  PT Eval and Treat   Return MD Appt:  2024  Date of Onset:  Onset Date: 20     Allergies:   Influenza vaccines  Restrictions/Precautions:   None      Interventions Planned (Treatment may consist of any combination of the following):     See Assessment Note    Subjective Comments:   Pt states she's been doing hep exercises this week and feels pretty good, just with a little L knee discomfort with squatting.   Initial Pain Level::     2/10  Post Session Pain Level:       2/10  Medications Last Reviewed:  2024  Updated Objective Findings:  None Today  Treatment   THERAPEUTIC EXERCISE: (24 minutes):    Exercises per grid below to improve mobility and strength.  Required moderate visual, verbal, manual, and tactile cues to promote proper body alignment.  Progressed resistance, range, repetitions, and complexity of movement as indicated.   Date:  2024 Date:  24 Date:  2/13/24 2/15/24

## 2024-02-29 ENCOUNTER — HOSPITAL ENCOUNTER (OUTPATIENT)
Dept: PHYSICAL THERAPY | Age: 59
Setting detail: RECURRING SERIES
End: 2024-02-29
Payer: COMMERCIAL

## 2024-02-29 NOTE — PROGRESS NOTES
Beth Mccallum  : 1965  Primary: [facility]  Secondary:  [facility]  [address]  [address]  [facility] [address]  Phone: [phone]  Fax: [phone]    PT Visit Info:    Total # of Visits to Date: 6  Progress Note Counter: 6  Progress Note Due Date: 24  Canceled Appointment: 4     OT Visit Info:  No data recorded    OUTPATIENT THERAPY: 2024  Episode  Appt Desk        Beth Mccallum cancelled her appointment for today due to  scheduling conflict .    Patient alerted well ahead of time and was able to change appointment to another date.  Will plan to follow up next during next appointment.  Thank you,  Sean Snow, LUIS    Future Appointments   Date Time Provider Department Center   2024  7:00 PM Sean Snow, PT [facility]   3/4/2024 11:20 AM Joaquina Cohen PA-C [facility]   3/5/2024  4:00 PM Sean Snow, PT [facility]   3/7/2024  7:30 AM Sean Snow, PT [facility]   3/11/2024  3:15 PM Sean Snow, PT [facility]   3/13/2024  3:15 PM Sean Snow, PT [facility]

## 2024-03-05 ENCOUNTER — HOSPITAL ENCOUNTER (OUTPATIENT)
Dept: PHYSICAL THERAPY | Age: 59
Setting detail: RECURRING SERIES
End: 2024-03-05
Payer: COMMERCIAL

## 2024-03-05 NOTE — PROGRESS NOTES
Beth Mccallum  : 1965  Primary: [facility]  [facility]  [facility]  [address]  [address]  [facility] [address]  Phone: [phone]  Fax: [phone]    PT Visit Info:    Total # of Visits to Date: 6  Progress Note Counter: 6  Progress Note Due Date: 24  Canceled Appointment: 4     OT Visit Info:  No data recorded    OUTPATIENT THERAPY: 3/5/2024  Episode  Appt Desk        Beth Mccallum cancelled her appointment for today due to  scheduling conflict .  Will plan to follow up next during next appointment.  Thank you,  Sean Snow, PT    Future Appointments   Date Time Provider Department Center   3/7/2024  7:30 AM Sean Snow, PT [facility]   3/11/2024  3:15 PM Sean Snow, PT [facility]   3/13/2024  3:15 PM Sean Snow, PT [facility]   3/26/2024  9:20 AM Joaquina Cohen, T[facility]

## 2024-03-07 ENCOUNTER — HOSPITAL ENCOUNTER (OUTPATIENT)
Dept: PHYSICAL THERAPY | Age: 59
Setting detail: RECURRING SERIES
Discharge: HOME | End: 2024-03-10
Payer: COMMERCIAL

## 2024-03-07 PROCEDURE — 97110 THERAPEUTIC EXERCISES: CPT

## 2024-03-07 PROCEDURE — 97112 NEUROMUSCULAR REEDUCATION: CPT

## 2024-03-07 ASSESSMENT — PAIN SCALES - GENERAL: PAINLEVEL_OUTOF10: 4

## 2024-03-07 NOTE — PROGRESS NOTES
Beth Mccallum  : 1965  Primary: [facility]  Secondary:  [place] [address]  [address]  [facility] [address]  Phone: [phone]  Fax: [phone] Plan Frequency: 2x/week for 6 weeks  Plan of Care/Certification Expiration Date: 24        Plan of Care/Certification Expiration Date:  Plan of Care/Certification Expiration Date: 24    Frequency/Duration: Plan Frequency: 2x/week for 6 weeks      Time In/Out:   Time In: 727  Time Out: 814      PT Visit Info:    Plan Frequency: 2x/week for 6 weeks  Progress Note Due Date: 24  Total # of Visits to Date: 7  Progress Note Counter: 7  Canceled Appointment: 4      Visit Count:  7    OUTPATIENT PHYSICAL THERAPY:   Treatment Note 3/7/2024       Episode  (Lumbar Spine)               Treatment Diagnosis:    Other low back pain  Pain in left hip  Medical/Referring Diagnosis:    Spondylolisthesis of lumbar region  Facet arthropathy, lumbar  Lumbar radiculopathy    Referring Physician:  Joaquina Cohen PA-C MD Orders:  PT Eval and Treat   Return MD Appt:  2024  Date of Onset:  Onset Date: 20     Allergies:   Influenza vaccines  Restrictions/Precautions:   None      Interventions Planned (Treatment may consist of any combination of the following):     See Assessment Note    Subjective Comments:   Pt reports she's been a little tired and sore this week as 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   Initial Pain Level::     4/10  Post Session Pain Level:       2/10  Medications Last Reviewed:  3/7/2024  Updated Objective Findings:  None Today  Treatment   THERAPEUTIC EXERCISE: (29 minutes):    Exercises per grid below to improve mobility and strength.  Required moderate visual, verbal, manual, and tactile cues to promote proper body alignment.  Progressed resistance, range, repetitions, and complexity of movement as indicated.   Date:  2/13/24 2/15/24 2/22/24

## 2024-03-11 ENCOUNTER — HOSPITAL ENCOUNTER (OUTPATIENT)
Dept: PHYSICAL THERAPY | Age: 59
Setting detail: RECURRING SERIES
Discharge: HOME | End: 2024-03-14
Payer: COMMERCIAL

## 2024-03-11 PROCEDURE — 97110 THERAPEUTIC EXERCISES: CPT

## 2024-03-11 PROCEDURE — 97112 NEUROMUSCULAR REEDUCATION: CPT

## 2024-03-11 ASSESSMENT — PAIN SCALES - GENERAL: PAINLEVEL_OUTOF10: 1

## 2024-03-11 NOTE — PROGRESS NOTES
Beth Mccallum  : 1965  Primary: [facility] (Commercial)  Secondary:  [place] [address]  [address]  [facility] [address]  Phone: [phone]  Fax: [phone] Plan Frequency: 2x/week for 6 weeks  Plan of Care/Certification Expiration Date: 24        Plan of Care/Certification Expiration Date:  Plan of Care/Certification Expiration Date: 24    Frequency/Duration: Plan Frequency: 2x/week for 6 weeks      Time In/Out:   Time In: 1515  Time Out: 1600      PT Visit Info:    Plan Frequency: 2x/week for 6 weeks  Progress Note Due Date: 04/10/24  Total # of Visits to Date: 8  Progress Note Counter: 8  Canceled Appointment: 4      Visit Count:  8    OUTPATIENT PHYSICAL THERAPY:   Treatment Note 3/11/2024       Episode  (Lumbar Spine)               Treatment Diagnosis:    Other low back pain  Pain in left hip  Medical/Referring Diagnosis:    Spondylolisthesis of lumbar region  Facet arthropathy, lumbar  Lumbar radiculopathy    Referring Physician:  Joaquina Cohen PA-C MD Orders:  PT Eval and Treat   Return MD Appt:  2024  Date of Onset:  Onset Date: 20     Allergies:   Influenza vaccines  Restrictions/Precautions:   None      Interventions Planned (Treatment may consist of any combination of the following):     See Assessment Note    Subjective Comments:   Pt states she's been consistently improving her exercise tolerance, strength, and pain, and has been better able to manage pain when it occurs.    Initial Pain Level::     10  Post Session Pain Level:       /10  Medications Last Reviewed:  3/11/2024  Updated Objective Findings:  See Recertification Note from today  Treatment   THERAPEUTIC EXERCISE: (27 minutes):    Exercises per grid below to improve mobility and strength.  Required moderate visual, verbal, manual, and tactile cues to promote proper body alignment.  Progressed resistance, range, repetitions, and complexity of movement as indicated.   Date:  24 
total score of 50.      Medical Necessity:   > Patient is expected to demonstrate progress in strength, range of motion, balance, coordination, and functional technique to improve performance of transfers, lifting demands, self care activities, ADLs and community ambulation.  Reason For Services/Other Comments:  Pt will require skilled PT intervention to address aforementioned goals and return to PLOF without pain or difficulty.         Regarding Beth Mccallum's therapy, I certify that the treatment plan above will be carried out by a therapist or under their direction.  Thank you for this referral,  Sean Snow, PT     Referring Physician Signature: Joaquina Cohen, TOMA                    Charge Capture  Appt Desk

## 2024-03-13 ENCOUNTER — HOSPITAL ENCOUNTER (OUTPATIENT)
Dept: PHYSICAL THERAPY | Age: 59
Setting detail: RECURRING SERIES
Discharge: HOME | End: 2024-03-16
Payer: COMMERCIAL

## 2024-03-13 PROCEDURE — 97110 THERAPEUTIC EXERCISES: CPT

## 2024-03-13 PROCEDURE — 97112 NEUROMUSCULAR REEDUCATION: CPT

## 2024-03-13 ASSESSMENT — PAIN SCALES - GENERAL: PAINLEVEL_OUTOF10: 2

## 2024-03-13 NOTE — PROGRESS NOTES
Beth Mccallum  : 1965  Primary: [facility] (Commercial)  Secondary:  [place] [address]  [address]  [facility] [address]  Phone: [phone]  Fax: [phone] Plan Frequency: 2x/week for 6 weeks  Plan of Care/Certification Expiration Date: 24        Plan of Care/Certification Expiration Date:  Plan of Care/Certification Expiration Date: 24    Frequency/Duration: Plan Frequency: 2x/week for 6 weeks      Time In/Out:   Time In: 1515  Time Out: 1602      PT Visit Info:    Plan Frequency: 2x/week for 6 weeks  Progress Note Due Date: 04/10/24  Total # of Visits to Date: 9  Progress Note Counter: 9  Canceled Appointment: 4      Visit Count:  9    OUTPATIENT PHYSICAL THERAPY:   Treatment Note 3/13/2024       Episode  (Lumbar Spine)               Treatment Diagnosis:    Other low back pain  Pain in left hip  Medical/Referring Diagnosis:    Spondylolisthesis of lumbar region  Facet arthropathy, lumbar  Lumbar radiculopathy    Referring Physician:  Joaquina Cohen PA-C MD Orders:  PT Eval and Treat   Return MD Appt:  2024  Date of Onset:  Onset Date: 20     Allergies:   Influenza vaccines  Restrictions/Precautions:   None      Interventions Planned (Treatment may consist of any combination of the following):     See Assessment Note    Subjective Comments:   Pt reports she feels pretty good today, was a little sore following last visit, but definitely feels she's getting stronger.   Initial Pain Level::     210  Post Session Pain Level:       /10  Medications Last Reviewed:  3/13/2024  Updated Objective Findings:  None Today  Treatment   THERAPEUTIC EXERCISE: (26 minutes):    Exercises per grid below to improve mobility and strength.  Required moderate visual, verbal, manual, and tactile cues to promote proper body alignment.  Progressed resistance, range, repetitions, and complexity of movement as indicated.   Date:  2/13/24 2/15/24 2/22/24 2/27/24 3/7/24 3/11/24 3/13/24

## 2024-03-19 ENCOUNTER — HOSPITAL ENCOUNTER (OUTPATIENT)
Dept: PHYSICAL THERAPY | Age: 59
Setting detail: RECURRING SERIES
Discharge: HOME | End: 2024-03-22
Payer: COMMERCIAL

## 2024-03-19 PROCEDURE — 97112 NEUROMUSCULAR REEDUCATION: CPT

## 2024-03-19 PROCEDURE — 97110 THERAPEUTIC EXERCISES: CPT

## 2024-03-19 ASSESSMENT — PAIN SCALES - GENERAL: PAINLEVEL_OUTOF10: 2

## 2024-03-19 NOTE — PROGRESS NOTES
Beth Mccallum  : 1965  Primary: [facility] (Commercial)  Secondary:  [place] [address]  [address]  [facility] [address]  Phone: [phone]  Fax: [phone] Plan Frequency: 2x/week for 6 weeks  Plan of Care/Certification Expiration Date: 24        Plan of Care/Certification Expiration Date:  Plan of Care/Certification Expiration Date: 24    Frequency/Duration: Plan Frequency: 2x/week for 6 weeks      Time In/Out:   Time In: 733  Time Out: 816      PT Visit Info:    Plan Frequency: 2x/week for 6 weeks  Progress Note Due Date: 04/10/24  Total # of Visits to Date: 9  Progress Note Counter: 9  Canceled Appointment: 4      Visit Count:  10    OUTPATIENT PHYSICAL THERAPY:   Treatment Note 3/19/2024       Episode  (Lumbar Spine)               Treatment Diagnosis:    Other low back pain  Pain in left hip  Medical/Referring Diagnosis:    Spondylolisthesis of lumbar region  Facet arthropathy, lumbar  Lumbar radiculopathy    Referring Physician:  Joaquina Cohen PA-C MD Orders:  PT Eval and Treat   Return MD Appt:  2024  Date of Onset:  Onset Date: 20     Allergies:   Influenza vaccines  Restrictions/Precautions:   None      Interventions Planned (Treatment may consist of any combination of the following):     See Assessment Note    Subjective Comments:   Pt states she did a lot of house/kitchen work this weekend and her back got a little fatigued, but she did HEP to help.   Initial Pain Level::     210  Post Session Pain Level:       /10  Medications Last Reviewed:  3/19/2024  Updated Objective Findings:  None Today  Treatment   THERAPEUTIC EXERCISE: (27 minutes):    Exercises per grid below to improve mobility and strength.  Required moderate visual, verbal, manual, and tactile cues to promote proper body alignment.  Progressed resistance, range, repetitions, and complexity of movement as indicated.   Date:  2/13/24 2/15/24 2/22/24 2/27/24 3/7/24 3/11/24 3/13/24

## 2024-03-21 ENCOUNTER — HOSPITAL ENCOUNTER (OUTPATIENT)
Dept: PHYSICAL THERAPY | Age: 59
Setting detail: RECURRING SERIES
Discharge: HOME | End: 2024-03-24
Payer: COMMERCIAL

## 2024-03-21 PROCEDURE — 97112 NEUROMUSCULAR REEDUCATION: CPT

## 2024-03-21 PROCEDURE — 97110 THERAPEUTIC EXERCISES: CPT

## 2024-03-21 ASSESSMENT — PAIN SCALES - GENERAL: PAINLEVEL_OUTOF10: 2

## 2024-03-21 NOTE — PROGRESS NOTES
Beth Mccallum  : 1965  Primary: [place] (Commercial)  Secondary:  [place] [address]  [address]  [facility] [address]  Phone: [phone]  Fax: [phone] Plan Frequency: 2x/week for 6 weeks  Plan of Care/Certification Expiration Date: 24        Plan of Care/Certification Expiration Date:  Plan of Care/Certification Expiration Date: 24    Frequency/Duration: Plan Frequency: 2x/week for 6 weeks      Time In/Out:   Time In: 730  Time Out: 817      PT Visit Info:    Plan Frequency: 2x/week for 6 weeks  Progress Note Due Date: 04/10/24  Total # of Visits to Date: 9  Progress Note Counter: 9  Canceled Appointment: 4      Visit Count:  11    OUTPATIENT PHYSICAL THERAPY:   Treatment Note 3/21/2024       Episode  (Lumbar Spine)               Treatment Diagnosis:    Other low back pain  Pain in left hip  Medical/Referring Diagnosis:    Spondylolisthesis of lumbar region  Facet arthropathy, lumbar  Lumbar radiculopathy    Referring Physician:  Joaquina Cohen PA-C MD Orders:  PT Eval and Treat   Return MD Appt:  2024  Date of Onset:  Onset Date: 20     Allergies:   Influenza vaccines  Restrictions/Precautions:   None      Interventions Planned (Treatment may consist of any combination of the following):     See Assessment Note    Subjective Comments:   Pt reports she's had some mid back/shoulder soreness following last visit, but overall she's been feeling really good.   Initial Pain Level::     2/10  Post Session Pain Level:       10  Medications Last Reviewed:  3/21/2024  Updated Objective Findings:  None Today  Treatment   THERAPEUTIC EXERCISE: (24 minutes):    Exercises per grid below to improve mobility and strength.  Required moderate visual, verbal, manual, and tactile cues to promote proper body alignment.  Progressed resistance, range, repetitions, and complexity of movement as indicated.   2/22/24 2/27/24 3/7/24 3/11/24 3/13/24 3/19/24 3/21/24

## 2024-03-25 ENCOUNTER — APPOINTMENT (OUTPATIENT)
Dept: PHYSICAL THERAPY | Age: 59
End: 2024-03-25
Payer: COMMERCIAL

## 2024-03-26 ENCOUNTER — OFFICE VISIT (OUTPATIENT)
Dept: ORTHOPEDIC SURGERY | Age: 59
End: 2024-03-26
Payer: COMMERCIAL

## 2024-03-26 VITALS — BODY MASS INDEX: 42.36 KG/M2 | HEIGHT: 62 IN | WEIGHT: 230.2 LBS

## 2024-03-26 DIAGNOSIS — M48.062 LUMBAR STENOSIS WITH NEUROGENIC CLAUDICATION: ICD-10-CM

## 2024-03-26 DIAGNOSIS — M47.816 FACET ARTHROPATHY, LUMBAR: ICD-10-CM

## 2024-03-26 DIAGNOSIS — M43.16 SPONDYLOLISTHESIS OF LUMBAR REGION: Primary | ICD-10-CM

## 2024-03-26 DIAGNOSIS — M54.16 LUMBAR RADICULOPATHY: ICD-10-CM

## 2024-03-26 PROCEDURE — G8417 CALC BMI ABV UP PARAM F/U: HCPCS | Performed by: PHYSICIAN ASSISTANT

## 2024-03-26 PROCEDURE — G8427 DOCREV CUR MEDS BY ELIG CLIN: HCPCS | Performed by: PHYSICIAN ASSISTANT

## 2024-03-26 PROCEDURE — 99213 OFFICE O/P EST LOW 20 MIN: CPT | Performed by: PHYSICIAN ASSISTANT

## 2024-03-26 PROCEDURE — 1036F TOBACCO NON-USER: CPT | Performed by: PHYSICIAN ASSISTANT

## 2024-03-26 PROCEDURE — G8484 FLU IMMUNIZE NO ADMIN: HCPCS | Performed by: PHYSICIAN ASSISTANT

## 2024-03-26 PROCEDURE — 3017F COLORECTAL CA SCREEN DOC REV: CPT | Performed by: PHYSICIAN ASSISTANT

## 2024-03-26 NOTE — PROGRESS NOTES
Name: Beth Mccallum  YOB: 1965  Gender: female  MRN: [id number]    CC: Back Pain (Follow up after PT)       HPI: This is a 59 y.o. year old female who reports over 4-year history of back pain mostly in the left side of the lower back.  She felt initial pain when she moved a dresser and she pushed it with her left hip and she felt a sharp pain or tear in her back and had continued pain since that time.  She notes swelling in her left buttock and left leg.  Pain can get up to 9 out of 10.  It can radiate around to the left lateral leg usually not below the knee.  It has been worse over the past 6 months and she has seen a chiropractor every 2 weeks over the past 6 months.  Symptoms worse with standing.      This patient  has not had lumbar surgery in the past.     Prior treatment: Chiropractic treatment    We noted she had spondylolisthesis at L4-5 and felt she likely had L4 radiculopathy and spondylitic back pain.  I ordered MRI scan of the lumbar spine.  L4-5 is level of anterior listhesis this has reduced on the MRI scan to subtle anterior listhesis compared to grade 1 prominent anterior listhesis on x-ray.  There is mild bilateral foraminal narrowing with some disc protrusion no significant central stenosis.  There is bulky facet arthropathy L4-5.  We talked about the treating this with physical therapy and even injections.  She did complete a course of physical therapy and she does feel that physical therapy has been very helpful.  Her pain is much less frequent and less intense.  She gets pain now when she is standing too long or cooking too long but she wants to continue with physical therapy and does not feel that it is severe enough to need an injection.  We have talked about injections likely being epidural injection at L4-5 and possibly even consider lumbar facets at that level.         1/9/2024    11:57 AM   AMB PAIN ASSESSMENT   Location of Pain Back   Location Modifiers Left   Severity of

## 2024-03-28 ENCOUNTER — HOSPITAL ENCOUNTER (OUTPATIENT)
Dept: PHYSICAL THERAPY | Age: 59
Setting detail: RECURRING SERIES
Discharge: HOME | End: 2024-03-31
Payer: COMMERCIAL

## 2024-03-28 PROCEDURE — 97110 THERAPEUTIC EXERCISES: CPT

## 2024-03-28 ASSESSMENT — PAIN SCALES - GENERAL: PAINLEVEL_OUTOF10: 1

## 2024-03-28 NOTE — PROGRESS NOTES
Beth Mccallum  : 1965  Primary: [facility] (Commercial)  Secondary:  [place] [address]  [address]  [facility] [address]  Phone: [phone]  Fax: [phone] Plan Frequency: 2x/week for 6 weeks  Plan of Care/Certification Expiration Date: 24        Plan of Care/Certification Expiration Date:  Plan of Care/Certification Expiration Date: 24    Frequency/Duration: Plan Frequency: 2x/week for 6 weeks      Time In/Out:   Time In: 730  Time Out: 815      PT Visit Info:    Plan Frequency: 2x/week for 6 weeks  Progress Note Due Date: 04/10/24  Total # of Visits to Date: 9  Progress Note Counter: 9  Canceled Appointment: 4      Visit Count:  12    OUTPATIENT PHYSICAL THERAPY:   Treatment Note 3/28/2024       Episode  (Lumbar Spine)               Treatment Diagnosis:    Other low back pain  Pain in left hip  Medical/Referring Diagnosis:    Spondylolisthesis of lumbar region  Facet arthropathy, lumbar  Lumbar radiculopathy    Referring Physician:  Joaquina Cohen PA-C MD Orders:  PT Eval and Treat   Return MD Appt:  2024  Date of Onset:  Onset Date: 20     Allergies:   Influenza vaccines  Restrictions/Precautions:   None      Interventions Planned (Treatment may consist of any combination of the following):     See Assessment Note    Subjective Comments:   Pt states she's continued to feel good with minimal pain. Only has any difficulty doing dishes or cooking with mild lumbar stiffness.   Initial Pain Level::     1/10  Post Session Pain Level:       1/10  Medications Last Reviewed:  3/28/2024  Updated Objective Findings:  None Today  Treatment   Access Code: [email or web address]  URL: [email or web address]  Date: 2024  Prepared by: Stew Gwendolyn    Exercises  - Supine Lower Trunk Rotation  - 1 x daily - 7 x weekly - 3 sets - 10 reps  - Clamshell  - 1 x daily - 7 x weekly - 3 sets - 10 reps  - Supine Bridge  - 1 x daily - 7 x weekly - 3 sets - 10 reps  - Single Leg

## 2024-03-28 NOTE — THERAPY DISCHARGE
Beth Mccallum  : 1965  Primary: [facility] (Commercial)  Secondary:  [place] [address]  [address]  [facility] [address]  Phone: [phone]  Fax: [phone] Plan Frequency: 2x/week for 6 weeks    Plan of Care/Certification Expiration Date: 24        Plan of Care/Certification Expiration Date:  Plan of Care/Certification Expiration Date: 24    Frequency/Duration: Plan Frequency: 2x/week for 6 weeks      Time In/Out:   Time In: 730  Time Out: 815      PT Visit Info:    Plan Frequency: 2x/week for 6 weeks  Progress Note Due Date: 04/10/24  Total # of Visits to Date: 9  Progress Note Counter: 9  Canceled Appointment: 4      Visit Count:  12                OUTPATIENT PHYSICAL THERAPY:             Initial Assessment 3/28/2024               Episode (Lumbar Spine)         Treatment Diagnosis:     Other low back pain  Pain in left hip  Medical/Referring Diagnosis:    Spondylolisthesis of lumbar region  Facet arthropathy, lumbar  Lumbar radiculopathy    Referring Physician:  Joaquina Cohen PA-C MD Orders:  PT Eval and Treat   Return MD Appt:  2024  Date of Onset:  Onset Date: 20     Allergies:  Influenza vaccines  Restrictions/Precautions:    None      Medications Last Reviewed:  3/28/2024     SUBJECTIVE   History of Injury/Illness (Reason for Referral):  Pt reports she's had 4 year history of L sided low back pain that began after moving a drawer back in .  Pt reports she's had pain off and on with functional activities such as vacuuming, prolonged walking, cleaning dishes, cooking, etc.  Pt reports she had surgery for appendectomy in , which has limited core strength.      3/11/24:  Over the course of PT, pt reports she's feeling stronger, has less pain, and is able to better manage pain when it does increase.   Patient Stated Goal(s):  \"Reduce pain and increase functional activities\"  Initial Pain Level:      1/10   Post Session Pain Level:

## 2024-07-24 ENCOUNTER — APPOINTMENT (RX ONLY)
Dept: URBAN - METROPOLITAN AREA CLINIC 24 | Facility: CLINIC | Age: 59
Setting detail: DERMATOLOGY
End: 2024-07-24

## 2024-07-24 DIAGNOSIS — L57.0 ACTINIC KERATOSIS: ICD-10-CM

## 2024-07-24 DIAGNOSIS — Z71.89 OTHER SPECIFIED COUNSELING: ICD-10-CM

## 2024-07-24 DIAGNOSIS — D485 NEOPLASM OF UNCERTAIN BEHAVIOR OF SKIN: ICD-10-CM

## 2024-07-24 DIAGNOSIS — L57.8 OTHER SKIN CHANGES DUE TO CHRONIC EXPOSURE TO NONIONIZING RADIATION: ICD-10-CM

## 2024-07-24 DIAGNOSIS — L92.0 GRANULOMA ANNULARE: ICD-10-CM

## 2024-07-24 PROBLEM — D48.5 NEOPLASM OF UNCERTAIN BEHAVIOR OF SKIN: Status: ACTIVE | Noted: 2024-07-24

## 2024-07-24 PROCEDURE — ? LIQUID NITROGEN

## 2024-07-24 PROCEDURE — 17000 DESTRUCT PREMALG LESION: CPT | Mod: 59

## 2024-07-24 PROCEDURE — ? BIOPSY BY SHAVE METHOD

## 2024-07-24 PROCEDURE — 99204 OFFICE O/P NEW MOD 45 MIN: CPT | Mod: 25

## 2024-07-24 PROCEDURE — ? COUNSELING

## 2024-07-24 PROCEDURE — ? PRESCRIPTION

## 2024-07-24 PROCEDURE — 11102 TANGNTL BX SKIN SINGLE LES: CPT

## 2024-07-24 PROCEDURE — 17003 DESTRUCT PREMALG LES 2-14: CPT

## 2024-07-24 PROCEDURE — ? PRESCRIPTION MEDICATION MANAGEMENT

## 2024-07-24 RX ORDER — CLOBETASOL PROPIONATE 0.5 MG/G
OINTMENT TOPICAL
Qty: 60 | Refills: 3 | Status: ERX | COMMUNITY
Start: 2024-07-24

## 2024-07-24 RX ADMIN — CLOBETASOL PROPIONATE: 0.5 OINTMENT TOPICAL at 00:00

## 2024-07-24 ASSESSMENT — LOCATION SIMPLE DESCRIPTION DERM
LOCATION SIMPLE: LEFT CLAVICULAR SKIN
LOCATION SIMPLE: LEFT SHOULDER
LOCATION SIMPLE: NOSE
LOCATION SIMPLE: RIGHT PRETIBIAL REGION
LOCATION SIMPLE: CHEST

## 2024-07-24 ASSESSMENT — LOCATION ZONE DERM
LOCATION ZONE: NOSE
LOCATION ZONE: TRUNK
LOCATION ZONE: LEG
LOCATION ZONE: ARM

## 2024-07-24 ASSESSMENT — LOCATION DETAILED DESCRIPTION DERM
LOCATION DETAILED: LEFT CLAVICULAR SKIN
LOCATION DETAILED: LEFT ANTERIOR SHOULDER
LOCATION DETAILED: NASAL DORSUM
LOCATION DETAILED: RIGHT DISTAL PRETIBIAL REGION
LOCATION DETAILED: UPPER STERNUM

## 2024-07-24 NOTE — PROCEDURE: BIOPSY BY SHAVE METHOD
Detail Level: Detailed
Depth Of Biopsy: dermis
Was A Bandage Applied: Yes
Size Of Lesion In Cm: 0
Biopsy Type: H and E
Biopsy Method: Dermablade
Anesthesia Type: 1% lidocaine with epinephrine
Anesthesia Volume In Cc: 0.5
Hemostasis: Drysol
Wound Care: Petrolatum
Dressing: bandage
Destruction After The Procedure: No
Type Of Destruction Used: Curettage
Curettage Text: The wound bed was treated with curettage after the biopsy was performed.
Cryotherapy Text: The wound bed was treated with cryotherapy after the biopsy was performed.
Electrodesiccation Text: The wound bed was treated with electrodesiccation after the biopsy was performed.
Electrodesiccation And Curettage Text: The wound bed was treated with electrodesiccation and curettage after the biopsy was performed.
Silver Nitrate Text: The wound bed was treated with silver nitrate after the biopsy was performed.
Lab: [id number]
Lab Facility: [id number]
Consent: Written consent was obtained and risks were reviewed including but not limited to scarring, infection, bleeding, scabbing, incomplete removal, nerve damage and allergy to anesthesia.
Post-Care Instructions: I reviewed with the patient in detail post-care instructions. Patient is to keep the biopsy site dry overnight, and then apply bacitracin twice daily until healed. Patient may apply hydrogen peroxide soaks to remove any crusting.
Notification Instructions: Patient will be notified of biopsy results. However, patient instructed to call the office if not contacted within 2 weeks.
Billing Type: Third-Party Bill
Information: Selecting Yes will display possible errors in your note based on the variables you have selected. This validation is only offered as a suggestion for you. PLEASE NOTE THAT THE VALIDATION TEXT WILL BE REMOVED WHEN YOU FINALIZE YOUR NOTE. IF YOU WANT TO FAX A PRELIMINARY NOTE YOU WILL NEED TO TOGGLE THIS TO 'NO' IF YOU DO NOT WANT IT IN YOUR FAXED NOTE.

## 2024-07-24 NOTE — PROCEDURE: LIQUID NITROGEN
Render Post-Care Instructions In Note?: no
Show Applicator Variable?: Yes
Consent: The patient's consent was obtained including but not limited to risks of crusting, scabbing, blistering, scarring, darker or lighter pigmentary change, recurrence, incomplete removal and infection.
Detail Level: Detailed
Duration Of Freeze Thaw-Cycle (Seconds): 4
Number Of Freeze-Thaw Cycles: 1 freeze-thaw cycle
Post-Care Instructions: I reviewed with the patient in detail post-care instructions. Patient is to wear sunprotection, and avoid picking at any of the treated lesions. Pt may apply Vaseline to crusted or scabbing areas.

## 2025-02-18 ENCOUNTER — OFFICE VISIT (OUTPATIENT)
Age: 60
End: 2025-02-18
Payer: COMMERCIAL

## 2025-02-18 ENCOUNTER — EVALUATION (OUTPATIENT)
Age: 60
End: 2025-02-18
Payer: COMMERCIAL

## 2025-02-18 DIAGNOSIS — Z78.9 IMPAIRED MOBILITY AND ADLS: ICD-10-CM

## 2025-02-18 DIAGNOSIS — Z74.09 IMPAIRED MOBILITY AND ADLS: ICD-10-CM

## 2025-02-18 DIAGNOSIS — M25.442 EFFUSION, LEFT HAND: ICD-10-CM

## 2025-02-18 DIAGNOSIS — M79.645 FINGER PAIN, LEFT: Primary | ICD-10-CM

## 2025-02-18 DIAGNOSIS — M79.642 PAIN OF LEFT HAND: Primary | ICD-10-CM

## 2025-02-18 PROCEDURE — 99214 OFFICE O/P EST MOD 30 MIN: CPT | Performed by: NURSE PRACTITIONER

## 2025-02-18 PROCEDURE — 97166 OT EVAL MOD COMPLEX 45 MIN: CPT | Performed by: OCCUPATIONAL THERAPIST

## 2025-02-18 PROCEDURE — 97110 THERAPEUTIC EXERCISES: CPT | Performed by: OCCUPATIONAL THERAPIST

## 2025-02-18 PROCEDURE — L3933 FO W/O JOINTS CF: HCPCS | Performed by: OCCUPATIONAL THERAPIST

## 2025-02-18 NOTE — PROGRESS NOTES
Orthopaedic Hand Clinic Note    Name: Beth Mccallum  YOB: 1965  Gender: female  MRN: [id number]      CC: Patient r seen today for left small finger pain    HPI: Beth Mccallum is a 59 y.o. female right hand dominant with a chief complaint of left small finger pain.  She reports on 2/9/2025 she had been moving and packing.  She thinks she got her left small finger caught on the door.  She noticed shortly after she could not straighten her finger, at the DIP joint.  She complains of mild discomfort and swelling.  She made herself a splint which she has been wearing.    ROS/Meds/PSH/PMH/FH/SH: I personally reviewed the patients standard intake form.  Pertinents are discussed in the HPI    Physical Examination:  General: Awake and alert.  HEENT: Normocephalic, atraumatic  CV/Pulm: Breathing even and unlabored  Skin: No obvious rashes noted.  Lymphatic: No obvious evidence of lymphedema or lymphadenopathy    Musculoskeletal Exam:  Examination on the left upper extremity demonstrates cap refill < 5 seconds in all fingers  Patient has mild swelling at the DIP joint of the left small finger.  She is unable to extend at the DIP joint.  She is slightly tender to palpation over the DIP joint.  She denies paresthesia.  She has good capillary refill.    Imaging / Electrodiagnostic Tests:     X-rays include a 3 view left small finger reviewed.  No fractures identified.    Assessment:   1. Pain of left hand        Plan:   We discussed the diagnosis and different treatment options. We discussed observation, therapy, antiinflammatory medications and other pertinent treatment modalities.    After discussing in detail the patient elects to proceed with seeing therapy today for a custom mallet splint.  She will wear the splint 24 hours/day x 6 weeks.  I will reassess her progress back in 6 weeks.  I will have her see therapy the same day to transition to night splinting for the next 6 weeks.    Overall time of this visit

## 2025-02-18 NOTE — PROGRESS NOTES
DIP joint in affected digit to 45° to improve ability to grasp.  Prevent DIP extension lag in affected digit no greater than 15°    Decrease edema in affected digit to minimal to improve motion.  Pt will have adequate strength to be able to hold and open containers without difficulty.  Pt will be able to make a full composite fist with the involved hand to enable to grasp and hold objects.  Improve Quick DASH functional assessment score from less than 10% deficit.      [facility] Portal     OT Protocols

## 2025-02-26 ENCOUNTER — TREATMENT (OUTPATIENT)
Age: 60
End: 2025-02-26

## 2025-02-26 DIAGNOSIS — M79.645 FINGER PAIN, LEFT: Primary | ICD-10-CM

## 2025-02-26 DIAGNOSIS — M25.442 EFFUSION, LEFT HAND: ICD-10-CM

## 2025-02-26 DIAGNOSIS — Z74.09 IMPAIRED MOBILITY AND ADLS: ICD-10-CM

## 2025-02-26 DIAGNOSIS — Z78.9 IMPAIRED MOBILITY AND ADLS: ICD-10-CM

## 2025-02-26 NOTE — PROGRESS NOTES
weeks after Shanta Vega appt.       PLAN OF CARE     Effective Dates: 2/18/2025 TO 5/19/2025 (90 days).    Frequency/Duration:  as needed  for 90 Day(s)  Interventions may include but are not limited to:   97110-Therapeutic procedure/exercise to develop ROM, strength, endurance and flexibility.  (09921- Manual therapy techniques to improve joint and/or soft tissue mobility, ROM, and function as well as helping to decrease pain/spasms and swelling  97530-Therapeutic activities using dynamic activities to improve function  44161 Initial orthotic management and training: Fabrication/adjustments as indicated  08840-Zptktouauh orthotic management utilizing splint repairs and adjustments addressing fit, comfort and positioning  Modalities prn to address pain, spasms, tissue extensibility and swelling:(82269) Hot/cold pack  (60960) Fluidotherapy  (76833) Paraffin    The referring medical provider has reviewed and approved this evaluation and plan of care as noted by the electronic signature attached to note.    GOALS     Short term goals:  (6 weeks, 4/1/2025  (6 weeks))  Patient will be I with HEP and precautions.  Patient to demonstrate independence with donning/doffing orthosis in one visit, Patient to verbalize understanding of inspecting skin to prevent pressure areas and allergic reaction due to orthosis, Patient to verbalize understanding of precautions and necessity of wearing orthosis as indicated by therapist, Patient to verbalize understanding of wound/pin care in one visit, and Patient to demonstrate independence with HEP in one visit  The patient will have full MP flexion and extension of the affected digit in order to maximize functional use of hand with ADL's.  The patient will have full PIP extension and flexion of the affected digit in order to maximize functional use of hand with ADL's.  The patient will maintain precautions of full DIP extension in affected digit at all times for 6 weeks.  Improve Quick

## 2025-04-08 NOTE — PROGRESS NOTES
ADL's.  MET  The patient will have full PIP extension and flexion of the affected digit in order to maximize functional use of hand with ADL's.  MET  The patient will maintain precautions of full DIP extension in affected digit at all times for 6 weeks.  MET  Improve Quick DASH functional assessment score from less than 20% deficit.  MET    Long term goals: (12 weeks, 5/13/2025  (12 weeks) )  Pt will be able to use the affected UE for all ADL's without difficulty.  Increase active digit flexion of DIP joint in affected digit to 45° to improve ability to grasp.  Prevent DIP extension lag in affected digit no greater than 15°    Decrease edema in affected digit to minimal to improve motion.  Pt will have adequate strength to be able to hold and open containers without difficulty.  Pt will be able to make a full composite fist with the involved hand to enable to grasp and hold objects.  Improve Quick DASH functional assessment score from less than 10% deficit.      [facility] Portal     OT Protocols

## 2025-04-09 ENCOUNTER — TREATMENT (OUTPATIENT)
Age: 60
End: 2025-04-09
Payer: COMMERCIAL

## 2025-04-09 ENCOUNTER — OFFICE VISIT (OUTPATIENT)
Age: 60
End: 2025-04-09
Payer: COMMERCIAL

## 2025-04-09 DIAGNOSIS — Z74.09 IMPAIRED MOBILITY AND ADLS: ICD-10-CM

## 2025-04-09 DIAGNOSIS — Z78.9 IMPAIRED MOBILITY AND ADLS: ICD-10-CM

## 2025-04-09 DIAGNOSIS — M25.442 EFFUSION, LEFT HAND: ICD-10-CM

## 2025-04-09 DIAGNOSIS — M79.645 FINGER PAIN, LEFT: Primary | ICD-10-CM

## 2025-04-09 DIAGNOSIS — M20.012 MALLET FINGER OF LEFT HAND: ICD-10-CM

## 2025-04-09 DIAGNOSIS — M79.642 PAIN OF LEFT HAND: Primary | ICD-10-CM

## 2025-04-09 PROCEDURE — 97763 ORTHC/PROSTC MGMT SBSQ ENC: CPT | Performed by: OCCUPATIONAL THERAPIST

## 2025-04-09 PROCEDURE — 99213 OFFICE O/P EST LOW 20 MIN: CPT | Performed by: NURSE PRACTITIONER

## 2025-04-09 NOTE — PROGRESS NOTES
voiced accordance and understanding of the information provided and the formulated plan. All questions were answered to the patient's satisfaction during the encounter.    3 This is an acute uncomplicated problem/injury  Treatment at this time:  therapy    EDGAR Ariza - CNP  Orthopaedic Surgery  04/09/25  8:25 AM

## 2025-04-23 ENCOUNTER — TREATMENT (OUTPATIENT)
Age: 60
End: 2025-04-23
Payer: COMMERCIAL

## 2025-04-23 DIAGNOSIS — M25.442 EFFUSION, LEFT HAND: ICD-10-CM

## 2025-04-23 DIAGNOSIS — Z74.09 IMPAIRED MOBILITY AND ADLS: ICD-10-CM

## 2025-04-23 DIAGNOSIS — M79.645 FINGER PAIN, LEFT: Primary | ICD-10-CM

## 2025-04-23 DIAGNOSIS — Z78.9 IMPAIRED MOBILITY AND ADLS: ICD-10-CM

## 2025-04-23 PROCEDURE — 97110 THERAPEUTIC EXERCISES: CPT | Performed by: OCCUPATIONAL THERAPIST

## 2025-04-23 NOTE — PROGRESS NOTES
[facility]THOPAED[facility]  [address] [place] [id number]  Dept: [phone]      Occupational Therapy Daily Note      Referring MD: Shanta Vega APRN - C*    Diagnosis:    Diagnosis Orders   1. Finger pain, left        2. Effusion, left hand        3. Impaired mobility and ADLs               Surgery: Date n/a mallet deformity     Therapy precautions:  mallet prec    Payor: Payor: [facility] /  /  /  Billing pattern: Commercial- substantial/midpoint time each CPT  Total Direct Treatment Time: 25 min                       Total In Office Time: 25 min   Modifier needed: No  Episode visit count:  4     Preferred Name:  Beth     SUBJECTIVE     Pt reports her finger has been very sore. She has been keeping her splint on mostly due to the pain. It is very sensitive to touch.      OBJECTIVE     Functional Outcome Measures: Quick Dash  25 score=   31.8 % functional deficit  Hand/Side Dominance: right handed  Observation/Posture: Holding UE in protected position  Palpation: TTP L SF  Swelling/Edema: minimal L SF  Skin Integrity: normal     A/PROM MEASUREMENTS      Digital ROM  (assessed while protected in splint) involved digit Left  4/9/25   AROM    MCP     AROM      PIP  64   AROM      DIP N/A -5/22     25° lag at time of initial visit prior to orthotic fabrication  4/9/25 5 degree lag at follow-up   4/23/25  -4 degree lag follow-up    TREATMENT      Therapeutic Exercise (65605): x25 minutes:   Discussion on continuing to wean from orthosis  PIP and DIP blocking exercises  Education on light functional use of hand to promote composite fist     Access Code: [email or web address]  URL: [email or web address]  Date: 04/09/2025  Prepared by: Emily Alcaraz    Exercises  - Seated Wrist Flexor Hook Fist Tendon Gliding  - 4-5 x daily - 7 x weekly - 1 sets - 15 reps    ASSESSMENT     The patient presents with L SF Mallet deformity, unspecified etiology. Pt does demonstrate only a slight DIP extensor

## 2025-05-08 ENCOUNTER — TREATMENT (OUTPATIENT)
Age: 60
End: 2025-05-08
Payer: COMMERCIAL

## 2025-05-08 DIAGNOSIS — Z78.9 IMPAIRED MOBILITY AND ADLS: ICD-10-CM

## 2025-05-08 DIAGNOSIS — M79.645 FINGER PAIN, LEFT: Primary | ICD-10-CM

## 2025-05-08 DIAGNOSIS — M25.442 EFFUSION, LEFT HAND: ICD-10-CM

## 2025-05-08 DIAGNOSIS — Z74.09 IMPAIRED MOBILITY AND ADLS: ICD-10-CM

## 2025-05-08 PROCEDURE — 97110 THERAPEUTIC EXERCISES: CPT | Performed by: OCCUPATIONAL THERAPIST

## 2025-05-08 NOTE — PROGRESS NOTES
[facility] ORTHOPAEDICS  [address] [place] [id number]  Dept: [phone]      Occupational Therapy Daily Note      Referring MD: Shanta Vega APRN - C*    Diagnosis:    Diagnosis Orders   1. Finger pain, left        2. Effusion, left hand        3. Impaired mobility and ADLs               Surgery: Date n/a mallet deformity     Therapy precautions:  mallet prec    Payor: Payor: [facility] /  /  /  Billing pattern: Commercial- substantial/midpoint time each CPT  Total Direct Treatment Time: 15 min                       Total In Office Time: 20 min   Modifier needed: No  Episode visit count:  5     Preferred Name:  Beth     SUBJECTIVE     Pt reports continued soreness in the finger. She has been keeping her splint on    OBJECTIVE     Functional Outcome Measures: Quick Dash  25 score=   31.8 % functional deficit  Hand/Side Dominance: right handed  Observation/Posture: Holding UE in protected position  Palpation: TTP L SF  Swelling/Edema: minimal L SF  Skin Integrity: normal     A/PROM MEASUREMENTS      Digital ROM  (assessed while protected in splint) involved digit Left  4/9/25   AROM    MCP     AROM      PIP  64   AROM      DIP N/A -5/22     25° lag at time of initial visit prior to orthotic fabrication  4/9/25 5 degree lag at follow-up   4/23/25  -4 degree lag follow-up    TREATMENT      Therapeutic Exercise ([id number]): x15 minutes:   Continued discussion on weaning from splint   Exercises reviewed     Access Code: [email or web address]  URL: [email or web address]  Date: 04/09/2025  Prepared by: Emily Alcaraz    Exercises  - Seated Wrist Flexor Hook Fist Tendon Gliding  - 4-5 x daily - 7 x weekly - 1 sets - 15 reps    ASSESSMENT     The patient presents with L SF Mallet deformity, unspecified etiology.     Pt has somewhat of a slight lag in the DIP. She is fairly stiff with active flexion. We reviewed importance of performing all her exercises and continuing to wean from orthosis.

## (undated) DEVICE — SUPPORT MAMM SURG 48-50 IN 2XL BRA

## (undated) DEVICE — SKIN STAPLER: Brand: SIGNET

## (undated) DEVICE — SURGICAL PROCEDURE PACK BASIC ST FRANCIS

## (undated) DEVICE — AMD ANTIMICROBIAL GAUZE SPONGES,12 PLY USP TYPE VII, 0.2% POLYHEXAMETHYLENE BIGUANIDE HCI (PHMB): Brand: CURITY

## (undated) DEVICE — AMD ANTIMICROBIAL BANDAGE ROLL,6 PLY: Brand: KERLIX

## (undated) DEVICE — DRAPE TWL SURG 16X26IN BLU ORB04] ALLCARE INC]

## (undated) DEVICE — TRAY PREP DRY W/ PREM GLV 2 APPL 6 SPNG 2 UNDPD 1 OVERWRAP

## (undated) DEVICE — REM POLYHESIVE ADULT PATIENT RETURN ELECTRODE: Brand: VALLEYLAB

## (undated) DEVICE — OCCLUSIVE GAUZE STRIP,3% BISMUTH TRIBROMOPHENATE IN PETROLATUM BLEND: Brand: XEROFORM

## (undated) DEVICE — SUTURE ABSRB X-1 REV CUT 1/2 CIR 22MM UD BRAID 27IN SZ 3-0 J458H

## (undated) DEVICE — SOLUTION IV 1000ML 0.9% SOD CHL

## (undated) DEVICE — SUTURE MCRYL SZ 5-0 L18IN ABSRB UD L19MM PS-2 3/8 CIR PRIM Y495G

## (undated) DEVICE — SUTURE VCRL SZ 4-0 L18IN ABSRB UD L19MM PS-2 3/8 CIR PRIM J496H

## (undated) DEVICE — PAD,ABDOMINAL,5"X9",STERILE,LF,1/PK: Brand: MEDLINE INDUSTRIES, INC.

## (undated) DEVICE — PUMP PAIN MGMT 400ML 4ML/HR 2 W/ SIL SOAK CATH FOR ABD

## (undated) DEVICE — SUTURE ETHLN SZ 4-0 L18IN NONABSORBABLE BLK L19MM PS-2 3/8 1667H

## (undated) DEVICE — SPONGE LAP 18X18IN STRL -- 5/PK

## (undated) DEVICE — NEEDLE HYPO 21GA L1.5IN INTRAMUSCULAR S STL LATCH BVL UP

## (undated) DEVICE — ABDOMINAL PAD: Brand: DERMACEA

## (undated) DEVICE — CONTAINER SPEC HISTOLOGY 900ML POLYPR

## (undated) DEVICE — SUTURE COAT VCRL SZ 4-0 L18IN ABSRB UD L19MM PS-2 1/2 CIR J496G

## (undated) DEVICE — 2000CC GUARDIAN II: Brand: GUARDIAN

## (undated) DEVICE — DRAPE,TOP,102X53,STERILE: Brand: MEDLINE

## (undated) DEVICE — SLIM BODY SKIN STAPLER: Brand: APPOSE ULC

## (undated) DEVICE — GOWN,REINF,POLY,ECL,PP SLV,XL: Brand: MEDLINE

## (undated) DEVICE — DRESSING,GAUZE,XEROFORM,CURAD,5"X9",ST: Brand: CURAD

## (undated) DEVICE — KENDALL SCD EXPRESS SLEEVES, KNEE LENGTH, MEDIUM: Brand: KENDALL SCD